# Patient Record
Sex: FEMALE | Race: WHITE | Employment: OTHER | URBAN - METROPOLITAN AREA
[De-identification: names, ages, dates, MRNs, and addresses within clinical notes are randomized per-mention and may not be internally consistent; named-entity substitution may affect disease eponyms.]

---

## 2016-03-09 LAB — DIABETIC RETINOPATHY: NORMAL

## 2019-03-01 LAB
ALBUMIN SERPL-MCNC: NORMAL G/DL
AVERAGE GLUCOSE: NORMAL
BUN / CREAT RATIO: NORMAL
BUN BLDV-MCNC: NORMAL MG/DL
CALCIUM SERPL-MCNC: NORMAL MG/DL
CHLORIDE BLD-SCNC: NORMAL MMOL/L
CHOLESTEROL, TOTAL: NORMAL MG/DL
CHOLESTEROL/HDL RATIO: NORMAL
CO2: NORMAL MMOL/L
CREAT SERPL-MCNC: NORMAL MG/DL
GLUCOSE: NORMAL
HBA1C MFR BLD: 6.6 %
HDLC SERPL-MCNC: NORMAL MG/DL (ref 35–70)
LDL CHOLESTEROL CALCULATED: NORMAL MG/DL (ref 0–160)
PHOSPHORUS: NORMAL MG/DL
POTASSIUM SERPL-SCNC: NORMAL MMOL/L
SODIUM BLD-SCNC: NORMAL MMOL/L
TRIGL SERPL-MCNC: NORMAL MG/DL
VLDLC SERPL CALC-MCNC: NORMAL MG/DL

## 2019-04-08 ENCOUNTER — OFFICE VISIT (OUTPATIENT)
Dept: ORTHOPEDIC SURGERY | Age: 84
End: 2019-04-08
Payer: COMMERCIAL

## 2019-04-08 VITALS
HEART RATE: 63 BPM | RESPIRATION RATE: 18 BRPM | HEIGHT: 65 IN | DIASTOLIC BLOOD PRESSURE: 85 MMHG | WEIGHT: 178 LBS | TEMPERATURE: 98.1 F | SYSTOLIC BLOOD PRESSURE: 158 MMHG | BODY MASS INDEX: 29.66 KG/M2

## 2019-04-08 DIAGNOSIS — M65.341 ACQUIRED TRIGGER FINGER OF RIGHT RING FINGER: Primary | ICD-10-CM

## 2019-04-08 DIAGNOSIS — G56.01 RIGHT CARPAL TUNNEL SYNDROME: ICD-10-CM

## 2019-04-08 DIAGNOSIS — M18.11 ARTHRITIS OF CARPOMETACARPAL (CMC) JOINT OF RIGHT THUMB: ICD-10-CM

## 2019-04-08 PROCEDURE — 99203 OFFICE O/P NEW LOW 30 MIN: CPT | Performed by: ORTHOPAEDIC SURGERY

## 2019-04-08 PROCEDURE — 20550 NJX 1 TENDON SHEATH/LIGAMENT: CPT | Performed by: ORTHOPAEDIC SURGERY

## 2019-04-08 RX ORDER — ALLOPURINOL 100 MG/1
100 TABLET ORAL
COMMUNITY
Start: 2011-11-10 | End: 2019-09-10 | Stop reason: SDUPTHER

## 2019-04-08 RX ORDER — GLIMEPIRIDE 1 MG/1
TABLET ORAL
COMMUNITY
Start: 2019-01-10

## 2019-04-08 RX ORDER — HYDROCODONE BITARTRATE AND ACETAMINOPHEN 10; 325 MG/1; MG/1
TABLET ORAL
COMMUNITY
Start: 2019-03-11 | End: 2019-06-07 | Stop reason: ALTCHOICE

## 2019-04-08 RX ORDER — DULOXETIN HYDROCHLORIDE 60 MG/1
CAPSULE, DELAYED RELEASE ORAL
COMMUNITY
Start: 2019-01-02 | End: 2019-09-26 | Stop reason: SDUPTHER

## 2019-04-08 RX ORDER — METOPROLOL TARTRATE 50 MG/1
25 TABLET, FILM COATED ORAL
COMMUNITY
End: 2020-04-13

## 2019-04-08 RX ORDER — DEXAMETHASONE SODIUM PHOSPHATE 4 MG/ML
4 INJECTION, SOLUTION INTRA-ARTICULAR; INTRALESIONAL; INTRAMUSCULAR; INTRAVENOUS; SOFT TISSUE ONCE
Status: COMPLETED | OUTPATIENT
Start: 2019-04-08 | End: 2019-04-08

## 2019-04-08 RX ORDER — LANOLIN ALCOHOL/MO/W.PET/CERES
CREAM (GRAM) TOPICAL
COMMUNITY

## 2019-04-08 RX ORDER — SIMVASTATIN 80 MG
TABLET ORAL
COMMUNITY
End: 2019-10-21 | Stop reason: SDUPTHER

## 2019-04-08 RX ORDER — DIGOXIN 125 MCG
TABLET ORAL
COMMUNITY
End: 2019-06-07 | Stop reason: SDUPTHER

## 2019-04-08 RX ORDER — WARFARIN SODIUM 4 MG/1
TABLET ORAL
COMMUNITY
Start: 2019-01-10 | End: 2019-07-22 | Stop reason: SDUPTHER

## 2019-04-08 RX ORDER — ASPIRIN 81 MG/1
TABLET ORAL
COMMUNITY
Start: 2009-02-12 | End: 2020-08-24

## 2019-04-08 RX ADMIN — DEXAMETHASONE SODIUM PHOSPHATE 4 MG: 4 INJECTION, SOLUTION INTRA-ARTICULAR; INTRALESIONAL; INTRAMUSCULAR; INTRAVENOUS; SOFT TISSUE at 15:12

## 2019-06-07 ENCOUNTER — OFFICE VISIT (OUTPATIENT)
Dept: FAMILY MEDICINE CLINIC | Age: 84
End: 2019-06-07
Payer: COMMERCIAL

## 2019-06-07 VITALS
HEART RATE: 77 BPM | SYSTOLIC BLOOD PRESSURE: 124 MMHG | HEIGHT: 65 IN | WEIGHT: 176.6 LBS | BODY MASS INDEX: 29.42 KG/M2 | TEMPERATURE: 96.9 F | OXYGEN SATURATION: 97 % | DIASTOLIC BLOOD PRESSURE: 74 MMHG

## 2019-06-07 DIAGNOSIS — Z79.01 ANTICOAGULANT LONG-TERM USE: Primary | ICD-10-CM

## 2019-06-07 DIAGNOSIS — M54.50 LUMBAR PAIN: ICD-10-CM

## 2019-06-07 DIAGNOSIS — E11.00 TYPE 2 DIABETES MELLITUS WITH HYPEROSMOLARITY WITHOUT COMA, WITHOUT LONG-TERM CURRENT USE OF INSULIN (HCC): ICD-10-CM

## 2019-06-07 DIAGNOSIS — I48.20 CHRONIC ATRIAL FIBRILLATION (HCC): ICD-10-CM

## 2019-06-07 LAB
INTERNATIONAL NORMALIZATION RATIO, POC: 1.8
PROTHROMBIN TIME, POC: 21.1

## 2019-06-07 PROCEDURE — 85610 PROTHROMBIN TIME: CPT | Performed by: INTERNAL MEDICINE

## 2019-06-07 PROCEDURE — 99213 OFFICE O/P EST LOW 20 MIN: CPT | Performed by: INTERNAL MEDICINE

## 2019-06-07 RX ORDER — HYDROCODONE BITARTRATE AND ACETAMINOPHEN 7.5; 325 MG/1; MG/1
1 TABLET ORAL EVERY 6 HOURS PRN
Qty: 90 TABLET | Refills: 0 | Status: SHIPPED | OUTPATIENT
Start: 2019-06-07 | End: 2019-07-07

## 2019-06-07 RX ORDER — DIGOXIN 125 MCG
125 TABLET ORAL DAILY
Qty: 90 TABLET | Refills: 1 | Status: SHIPPED | OUTPATIENT
Start: 2019-06-07 | End: 2019-12-20 | Stop reason: SDUPTHER

## 2019-06-07 ASSESSMENT — PATIENT HEALTH QUESTIONNAIRE - PHQ9
2. FEELING DOWN, DEPRESSED OR HOPELESS: 0
SUM OF ALL RESPONSES TO PHQ QUESTIONS 1-9: 0
1. LITTLE INTEREST OR PLEASURE IN DOING THINGS: 0
SUM OF ALL RESPONSES TO PHQ9 QUESTIONS 1 & 2: 0
SUM OF ALL RESPONSES TO PHQ QUESTIONS 1-9: 0

## 2019-06-07 NOTE — PROGRESS NOTES
2019     Joshua Hi (:  1935) is a 80 y.o. female presents for recheck of INR and medication refills. She had fallen at [facility] last week had a large bruise underneath her right eye and on the right ankle. OARRS 6-7-19  She will be due for a urine drug screen next month. Pain at its worse is an 8/10 and at its best a 2/10  most of the pain is in her lower back and knees. She does seem a little confused today when I have explained the instructions for her Coumadin. I will be lowering the amount of narcotic medication on the patient as I cannot assure that the patient is receiving this. Review of Systems    Prior to Visit Medications    Medication Sig Taking? Authorizing Provider   digoxin (LANOXIN) 125 MCG tablet Take 1 tablet by mouth daily Yes Rose Tompkins DO   HYDROcodone-acetaminophen (NORCO) 7.5-325 MG per tablet Take 1 tablet by mouth every 6 hours as needed for Pain for up to 30 days.  Intended supply: 30 days Yes Rose Tompkins DO   allopurinol (ZYLOPRIM) 100 MG tablet Take 100 mg by mouth Yes Historical Provider, MD   aspirin (ECOTRIN LOW STRENGTH) 81 MG EC tablet Take by mouth Yes Historical Provider, MD   DULoxetine (CYMBALTA) 60 MG extended release capsule  Yes Historical Provider, MD   LUTEIN PO Take by mouth Yes Historical Provider, MD   metFORMIN (GLUCOPHAGE) 500 MG tablet Take by mouth Yes Historical Provider, MD   metoprolol tartrate (LOPRESSOR) 50 MG tablet Take by mouth Yes Historical Provider, MD   niacin (SLO-NIACIN) 500 MG extended release tablet Take by mouth Yes Historical Provider, MD   simvastatin (ZOCOR) 80 MG tablet Take by mouth Yes Historical Provider, MD   warfarin (COUMADIN) 4 MG tablet  Yes Historical Provider, MD   glimepiride (AMARYL) 1 MG tablet   Historical Provider, MD        Social History     Tobacco Use    Smoking status: Never Smoker    Smokeless tobacco: Never Used   Substance Use Topics    Alcohol use: Not on file Vitals:    06/07/19 1401   BP: 124/74   Pulse: 77   Temp: 96.9 °F (36.1 °C)   SpO2: 97%   Weight: 176 lb 9.6 oz (80.1 kg)   Height: 5' 5\" (1.651 m)     Estimated body mass index is 29.39 kg/m² as calculated from the following:    Height as of this encounter: 5' 5\" (1.651 m). Weight as of this encounter: 176 lb 9.6 oz (80.1 kg). Physical Exam   Heart is atrial fibrillation and flutter there is no gallops noted. Lungs are clear to auscultation without wheezes rales or rhonchi. HEENT exam shows posterior pharynx intact. Tongue and uvula midline and intact. I do not notice any contusions of the face. However the right ankle does still show contusion but there is no lacerations or scabbed lesions. No edema of the lower extremities today. She ambulates without difficulty and can go from a seated to a standing position on her own without assistance required. ASSESSMENT/PLAN:    ICD-10-CM    1. Anticoagulant long-term use Z79.01 POCT INR     POCT INR     APTT   2. Chronic atrial fibrillation (HCC) I48.2 APTT     Renal Panel     CBC Auto Differential   3. Lumbar pain M54.5 HYDROcodone-acetaminophen (NORCO) 7.5-325 MG per tablet   4. Type 2 diabetes mellitus with hyperosmolarity without coma, without long-term current use of insulin (HCC) E11.00 Hemoglobin A1C      Patient requests narcotic medication refill. I have reviewed the current medications and prior notes regarding the need for these refills  I believe the need for refill is warranted at this time. I have reviewed the OARRS report and have found no suspicion of drug seeking behavior. I have discussed the side effects and narcotic policy with this patient ad the patient has demonstrated understanding. Following the guidelines of the CDC, [facility] and [facility] for a higher requirements refill of the narcotic was given today  A follow up appointment will be scheduled with me.       Patient's INR was only 1.8 today but she admitted she had missed at least

## 2019-06-07 NOTE — PATIENT INSTRUCTIONS
1.  Have Laboratory performed in 2 weeks for recheck of the INR also  2. Look in patient's provider network book for dermatology and I will make a referral  3. Coumadin is to be 4 mg daily.  1 time only take 8 mg Coumadin on Saturday 6-8-2019

## 2019-06-26 LAB
BASOPHILS ABSOLUTE: NORMAL /ΜL
BASOPHILS RELATIVE PERCENT: NORMAL %
EOSINOPHILS ABSOLUTE: NORMAL /ΜL
EOSINOPHILS RELATIVE PERCENT: NORMAL %
HCT VFR BLD CALC: NORMAL % (ref 36–46)
HEMOGLOBIN: NORMAL G/DL (ref 12–16)
LYMPHOCYTES ABSOLUTE: NORMAL /ΜL
LYMPHOCYTES RELATIVE PERCENT: NORMAL %
MCH RBC QN AUTO: NORMAL PG
MCHC RBC AUTO-ENTMCNC: NORMAL G/DL
MCV RBC AUTO: NORMAL FL
MONOCYTES ABSOLUTE: NORMAL /ΜL
MONOCYTES RELATIVE PERCENT: NORMAL %
NEUTROPHILS ABSOLUTE: NORMAL /ΜL
NEUTROPHILS RELATIVE PERCENT: NORMAL %
PDW BLD-RTO: NORMAL %
PLATELET # BLD: NORMAL K/ΜL
PMV BLD AUTO: NORMAL FL
RBC # BLD: NORMAL 10^6/ΜL
WBC # BLD: NORMAL 10^3/ML

## 2019-07-04 LAB
BASOPHILS ABSOLUTE: NORMAL /ΜL
BASOPHILS RELATIVE PERCENT: NORMAL %
BILIRUBIN, URINE: NORMAL
BLOOD, URINE: NORMAL
BUN BLDV-MCNC: 23 MG/DL
CALCIUM SERPL-MCNC: 9.3 MG/DL
CHLORIDE BLD-SCNC: 104 MMOL/L
CLARITY: NORMAL
CO2: 21 MMOL/L
COLOR: NORMAL
CREAT SERPL-MCNC: 0.9 MG/DL
EOSINOPHILS ABSOLUTE: NORMAL /ΜL
EOSINOPHILS RELATIVE PERCENT: NORMAL %
GFR CALCULATED: 60
GLUCOSE BLD-MCNC: 229 MG/DL
GLUCOSE URINE: NORMAL
HCT VFR BLD CALC: NORMAL % (ref 36–46)
HEMOGLOBIN: NORMAL G/DL (ref 12–16)
KETONES, URINE: NORMAL
LEUKOCYTE ESTERASE, URINE: NORMAL
LYMPHOCYTES ABSOLUTE: NORMAL /ΜL
LYMPHOCYTES RELATIVE PERCENT: NORMAL %
MCH RBC QN AUTO: NORMAL PG
MCHC RBC AUTO-ENTMCNC: NORMAL G/DL
MCV RBC AUTO: NORMAL FL
MONOCYTES ABSOLUTE: NORMAL /ΜL
MONOCYTES RELATIVE PERCENT: NORMAL %
NEUTROPHILS ABSOLUTE: NORMAL /ΜL
NEUTROPHILS RELATIVE PERCENT: NORMAL %
NITRITE, URINE: NORMAL
PH UA: NORMAL (ref 4.5–8)
PLATELET # BLD: NORMAL K/ΜL
PMV BLD AUTO: NORMAL FL
POTASSIUM SERPL-SCNC: 4.5 MMOL/L
PROTEIN UA: NORMAL
RBC # BLD: NORMAL 10^6/ΜL
SODIUM BLD-SCNC: 138 MMOL/L
SPECIFIC GRAVITY, URINE: NORMAL
UROBILINOGEN, URINE: NORMAL
WBC # BLD: NORMAL 10^3/ML

## 2019-07-09 ENCOUNTER — TELEPHONE (OUTPATIENT)
Dept: ADMINISTRATIVE | Age: 84
End: 2019-07-09

## 2019-07-10 ENCOUNTER — TELEPHONE (OUTPATIENT)
Dept: FAMILY MEDICINE CLINIC | Age: 84
End: 2019-07-10

## 2019-07-11 ENCOUNTER — TELEPHONE (OUTPATIENT)
Dept: FAMILY MEDICINE CLINIC | Age: 84
End: 2019-07-11

## 2019-07-18 NOTE — TELEPHONE ENCOUNTER
Shalom Nichols from [facility] called and would like to set up HFU appt. Isabella bo 07/10-Hip. No availability. . Please advise.  Shalom Nichols [phone]

## 2019-07-22 ENCOUNTER — OFFICE VISIT (OUTPATIENT)
Dept: FAMILY MEDICINE CLINIC | Age: 84
End: 2019-07-22
Payer: COMMERCIAL

## 2019-07-22 VITALS — TEMPERATURE: 98 F | DIASTOLIC BLOOD PRESSURE: 82 MMHG | OXYGEN SATURATION: 97 % | SYSTOLIC BLOOD PRESSURE: 140 MMHG

## 2019-07-22 DIAGNOSIS — E11.9 TYPE 2 DIABETES MELLITUS WITHOUT COMPLICATION, WITHOUT LONG-TERM CURRENT USE OF INSULIN (HCC): Primary | ICD-10-CM

## 2019-07-22 DIAGNOSIS — Z79.01 ANTICOAGULANT LONG-TERM USE: ICD-10-CM

## 2019-07-22 DIAGNOSIS — S32.601A CLOSED NONDISPLACED FRACTURE OF RIGHT ISCHIUM, UNSPECIFIED FRACTURE MORPHOLOGY, INITIAL ENCOUNTER (HCC): ICD-10-CM

## 2019-07-22 DIAGNOSIS — I10 ESSENTIAL HYPERTENSION, BENIGN: ICD-10-CM

## 2019-07-22 LAB
INTERNATIONAL NORMALIZATION RATIO, POC: 2.8
PROTHROMBIN TIME, POC: 33

## 2019-07-22 PROCEDURE — 99214 OFFICE O/P EST MOD 30 MIN: CPT | Performed by: INTERNAL MEDICINE

## 2019-07-22 PROCEDURE — 85610 PROTHROMBIN TIME: CPT | Performed by: INTERNAL MEDICINE

## 2019-07-22 RX ORDER — CLONIDINE HYDROCHLORIDE 0.1 MG/1
TABLET ORAL
Refills: 0 | COMMUNITY
Start: 2019-07-10

## 2019-07-22 NOTE — PATIENT INSTRUCTIONS
1.  INR is theraputic at 2.7. No changes and recheck warfarin in 1 month  2. Metformin 500mg is to be taken at night  3. Stay on Clonidine  4.   Metoprolol is to stay at 25mg twice a day

## 2019-07-23 RX ORDER — WARFARIN SODIUM 4 MG/1
TABLET ORAL
Qty: 180 TABLET | Refills: 1 | Status: SHIPPED
Start: 2019-07-23 | End: 2020-07-28

## 2019-07-29 ENCOUNTER — TELEPHONE (OUTPATIENT)
Dept: FAMILY MEDICINE CLINIC | Age: 84
End: 2019-07-29

## 2019-07-29 NOTE — TELEPHONE ENCOUNTER
Bambi Varghese went out to home & here is their update-- pt has taken all meds as directed. Has held Dig over the last 4 days. BP is 180/90, HR 55. Denies dizziness, N/V, headaches, no swelling noted, lungs are clear.  If there are any changes in orders, we are to contact [facility]

## 2019-07-29 NOTE — TELEPHONE ENCOUNTER
Pt called & left vm stating her pulse is 48 & she would like some guidance on what to do next. Left message for pt to return call.

## 2019-07-29 NOTE — TELEPHONE ENCOUNTER
Pt called [facility] & made them aware her bp is 182/88 & pulse is 48 . They said if you'd like them to go out today to check her, they need an order.

## 2019-08-12 RX ORDER — CLONIDINE HYDROCHLORIDE 0.1 MG/1
0.1 TABLET ORAL 2 TIMES DAILY
Qty: 60 TABLET | Refills: 0 | Status: SHIPPED | OUTPATIENT
Start: 2019-08-12

## 2019-08-12 RX ORDER — CLONIDINE HYDROCHLORIDE 0.1 MG/1
0.1 TABLET ORAL 2 TIMES DAILY
COMMUNITY
End: 2019-08-12 | Stop reason: SDUPTHER

## 2019-08-13 DIAGNOSIS — I48.20 CHRONIC ATRIAL FIBRILLATION (HCC): ICD-10-CM

## 2019-08-13 DIAGNOSIS — Z79.01 ANTICOAGULANT LONG-TERM USE: ICD-10-CM

## 2019-08-13 DIAGNOSIS — E11.00 TYPE 2 DIABETES MELLITUS WITH HYPEROSMOLARITY WITHOUT COMA, WITHOUT LONG-TERM CURRENT USE OF INSULIN (HCC): ICD-10-CM

## 2019-08-22 ENCOUNTER — ANTI-COAG VISIT (OUTPATIENT)
Dept: FAMILY MEDICINE CLINIC | Age: 84
End: 2019-08-22
Payer: COMMERCIAL

## 2019-08-22 DIAGNOSIS — Z79.01 ANTICOAGULANT LONG-TERM USE: Primary | ICD-10-CM

## 2019-08-22 DIAGNOSIS — I48.20 CHRONIC ATRIAL FIBRILLATION (HCC): ICD-10-CM

## 2019-08-22 DIAGNOSIS — E11.9 TYPE 2 DIABETES MELLITUS WITHOUT COMPLICATION, WITHOUT LONG-TERM CURRENT USE OF INSULIN (HCC): ICD-10-CM

## 2019-08-22 DIAGNOSIS — I10 ESSENTIAL HYPERTENSION, BENIGN: ICD-10-CM

## 2019-08-22 PROCEDURE — 99213 OFFICE O/P EST LOW 20 MIN: CPT | Performed by: INTERNAL MEDICINE

## 2019-08-26 PROBLEM — I10 ESSENTIAL HYPERTENSION, BENIGN: Status: ACTIVE | Noted: 2019-08-26

## 2019-08-26 PROBLEM — Z79.01 ANTICOAGULANT LONG-TERM USE: Status: ACTIVE | Noted: 2019-08-26

## 2019-08-26 PROBLEM — E11.9 TYPE 2 DIABETES MELLITUS WITHOUT COMPLICATION, WITHOUT LONG-TERM CURRENT USE OF INSULIN (HCC): Status: ACTIVE | Noted: 2019-08-26

## 2019-08-26 PROBLEM — I48.20 CHRONIC ATRIAL FIBRILLATION (HCC): Status: ACTIVE | Noted: 2019-08-26

## 2019-08-26 PROBLEM — M54.50 LUMBAR PAIN: Status: ACTIVE | Noted: 2019-08-26

## 2019-08-29 ENCOUNTER — NURSE ONLY (OUTPATIENT)
Dept: FAMILY MEDICINE CLINIC | Age: 84
End: 2019-08-29
Payer: COMMERCIAL

## 2019-08-29 DIAGNOSIS — Z79.01 ANTICOAGULANT LONG-TERM USE: ICD-10-CM

## 2019-08-29 LAB
INTERNATIONAL NORMALIZATION RATIO, POC: 2.6
PROTHROMBIN TIME, POC: 31.4

## 2019-08-29 PROCEDURE — 85610 PROTHROMBIN TIME: CPT | Performed by: INTERNAL MEDICINE

## 2019-09-13 RX ORDER — ALLOPURINOL 100 MG/1
TABLET ORAL
Qty: 90 TABLET | Refills: 1 | Status: SHIPPED
Start: 2019-09-13 | End: 2020-03-05 | Stop reason: SDUPTHER

## 2019-09-26 ENCOUNTER — ANTI-COAG VISIT (OUTPATIENT)
Dept: FAMILY MEDICINE CLINIC | Age: 84
End: 2019-09-26
Payer: COMMERCIAL

## 2019-09-26 VITALS
SYSTOLIC BLOOD PRESSURE: 124 MMHG | DIASTOLIC BLOOD PRESSURE: 70 MMHG | TEMPERATURE: 97.1 F | HEART RATE: 68 BPM | OXYGEN SATURATION: 98 %

## 2019-09-26 DIAGNOSIS — I48.20 CHRONIC ATRIAL FIBRILLATION (HCC): Primary | ICD-10-CM

## 2019-09-26 DIAGNOSIS — I10 ESSENTIAL HYPERTENSION, BENIGN: ICD-10-CM

## 2019-09-26 DIAGNOSIS — E11.9 TYPE 2 DIABETES MELLITUS WITHOUT COMPLICATION, WITHOUT LONG-TERM CURRENT USE OF INSULIN (HCC): ICD-10-CM

## 2019-09-26 LAB — INTERNATIONAL NORMALIZATION RATIO, POC: 2.2

## 2019-09-26 PROCEDURE — 85610 PROTHROMBIN TIME: CPT | Performed by: INTERNAL MEDICINE

## 2019-09-26 PROCEDURE — 99213 OFFICE O/P EST LOW 20 MIN: CPT | Performed by: INTERNAL MEDICINE

## 2019-09-26 RX ORDER — DULOXETIN HYDROCHLORIDE 60 MG/1
60 CAPSULE, DELAYED RELEASE ORAL DAILY
Qty: 30 CAPSULE | Refills: 5 | Status: SHIPPED
Start: 2019-09-26 | End: 2020-04-13

## 2019-09-26 RX ORDER — SOLIFENACIN SUCCINATE 5 MG/1
5 TABLET, FILM COATED ORAL DAILY
Qty: 30 TABLET | Refills: 5 | Status: SHIPPED | OUTPATIENT
Start: 2019-09-26 | End: 2020-01-06 | Stop reason: SDUPTHER

## 2019-09-26 RX ORDER — RANITIDINE 150 MG/1
150 TABLET ORAL 2 TIMES DAILY PRN
Qty: 60 TABLET | Refills: 0 | Status: SHIPPED
Start: 2019-09-26 | End: 2020-08-24

## 2019-10-02 ENCOUNTER — TELEPHONE (OUTPATIENT)
Dept: FAMILY MEDICINE CLINIC | Age: 84
End: 2019-10-02

## 2019-10-18 SDOH — HEALTH STABILITY: MENTAL HEALTH: HOW OFTEN DO YOU HAVE A DRINK CONTAINING ALCOHOL?: NEVER

## 2019-10-21 ENCOUNTER — OFFICE VISIT (OUTPATIENT)
Dept: FAMILY MEDICINE CLINIC | Age: 84
End: 2019-10-21
Payer: COMMERCIAL

## 2019-10-21 VITALS
OXYGEN SATURATION: 96 % | BODY MASS INDEX: 29.02 KG/M2 | HEIGHT: 64 IN | SYSTOLIC BLOOD PRESSURE: 152 MMHG | TEMPERATURE: 97.3 F | WEIGHT: 170 LBS | HEART RATE: 59 BPM | DIASTOLIC BLOOD PRESSURE: 68 MMHG

## 2019-10-21 DIAGNOSIS — Z13.820 SCREENING FOR OSTEOPOROSIS: ICD-10-CM

## 2019-10-21 DIAGNOSIS — Z79.01 ANTICOAGULANT LONG-TERM USE: ICD-10-CM

## 2019-10-21 DIAGNOSIS — S60.221A TRAUMATIC HEMATOMA OF RIGHT HAND, INITIAL ENCOUNTER: ICD-10-CM

## 2019-10-21 DIAGNOSIS — T14.90XA TRAUMA: Primary | ICD-10-CM

## 2019-10-21 LAB — INTERNATIONAL NORMALIZATION RATIO, POC: 1.5

## 2019-10-21 PROCEDURE — 99214 OFFICE O/P EST MOD 30 MIN: CPT | Performed by: INTERNAL MEDICINE

## 2019-10-21 PROCEDURE — 85610 PROTHROMBIN TIME: CPT | Performed by: INTERNAL MEDICINE

## 2019-10-21 RX ORDER — TRAMADOL HYDROCHLORIDE 50 MG/1
50 TABLET ORAL EVERY 8 HOURS PRN
Qty: 30 TABLET | Refills: 0 | Status: SHIPPED | OUTPATIENT
Start: 2019-10-21 | End: 2020-02-10 | Stop reason: SDUPTHER

## 2019-10-21 RX ORDER — SIMVASTATIN 80 MG
80 TABLET ORAL NIGHTLY
Qty: 90 TABLET | Refills: 1 | Status: SHIPPED
Start: 2019-10-21 | End: 2020-04-13

## 2019-11-05 ENCOUNTER — OFFICE VISIT (OUTPATIENT)
Dept: FAMILY MEDICINE CLINIC | Age: 84
End: 2019-11-05
Payer: COMMERCIAL

## 2019-11-05 VITALS
BODY MASS INDEX: 28.68 KG/M2 | TEMPERATURE: 97.9 F | OXYGEN SATURATION: 98 % | WEIGHT: 168 LBS | DIASTOLIC BLOOD PRESSURE: 84 MMHG | HEIGHT: 64 IN | SYSTOLIC BLOOD PRESSURE: 142 MMHG | HEART RATE: 50 BPM

## 2019-11-05 DIAGNOSIS — I10 ESSENTIAL HYPERTENSION, BENIGN: ICD-10-CM

## 2019-11-05 DIAGNOSIS — E11.9 TYPE 2 DIABETES MELLITUS WITHOUT COMPLICATION, WITHOUT LONG-TERM CURRENT USE OF INSULIN (HCC): ICD-10-CM

## 2019-11-05 DIAGNOSIS — I48.20 CHRONIC ATRIAL FIBRILLATION (HCC): Primary | ICD-10-CM

## 2019-11-05 LAB — INTERNATIONAL NORMALIZATION RATIO, POC: 2.7

## 2019-11-05 PROCEDURE — 99213 OFFICE O/P EST LOW 20 MIN: CPT | Performed by: INTERNAL MEDICINE

## 2019-11-05 PROCEDURE — 85610 PROTHROMBIN TIME: CPT | Performed by: INTERNAL MEDICINE

## 2019-11-18 ENCOUNTER — TELEPHONE (OUTPATIENT)
Dept: FAMILY MEDICINE CLINIC | Age: 84
End: 2019-11-18

## 2019-12-06 ENCOUNTER — HOSPITAL ENCOUNTER (OUTPATIENT)
Age: 84
Discharge: HOME | End: 2019-12-08
Payer: COMMERCIAL

## 2019-12-06 ENCOUNTER — OFFICE VISIT (OUTPATIENT)
Dept: FAMILY MEDICINE CLINIC | Age: 84
End: 2019-12-06
Payer: COMMERCIAL

## 2019-12-06 VITALS
BODY MASS INDEX: 29.02 KG/M2 | HEIGHT: 64 IN | TEMPERATURE: 97.6 F | SYSTOLIC BLOOD PRESSURE: 136 MMHG | OXYGEN SATURATION: 99 % | HEART RATE: 57 BPM | DIASTOLIC BLOOD PRESSURE: 72 MMHG | WEIGHT: 170 LBS

## 2019-12-06 DIAGNOSIS — I48.20 CHRONIC ATRIAL FIBRILLATION (HCC): ICD-10-CM

## 2019-12-06 DIAGNOSIS — I48.20 CHRONIC ATRIAL FIBRILLATION (HCC): Primary | ICD-10-CM

## 2019-12-06 DIAGNOSIS — E11.9 TYPE 2 DIABETES MELLITUS WITHOUT COMPLICATION, WITHOUT LONG-TERM CURRENT USE OF INSULIN (HCC): ICD-10-CM

## 2019-12-06 DIAGNOSIS — I10 ESSENTIAL HYPERTENSION, BENIGN: ICD-10-CM

## 2019-12-06 LAB
ALBUMIN SERPL-MCNC: 4 G/DL (ref 3.5–5.2)
ALP BLD-CCNC: 103 U/L (ref 35–104)
ALT SERPL-CCNC: 15 U/L (ref 0–32)
ANION GAP SERPL CALCULATED.3IONS-SCNC: 18 MMOL/L (ref 7–16)
AST SERPL-CCNC: 20 U/L (ref 0–31)
BASOPHILS ABSOLUTE: 0.07 E9/L (ref 0–0.2)
BASOPHILS RELATIVE PERCENT: 0.7 % (ref 0–2)
BILIRUB SERPL-MCNC: 0.5 MG/DL (ref 0–1.2)
BILIRUBIN DIRECT: <0.2 MG/DL (ref 0–0.3)
BILIRUBIN, INDIRECT: NORMAL MG/DL (ref 0–1)
BUN BLDV-MCNC: 19 MG/DL (ref 8–23)
CALCIUM SERPL-MCNC: 9.7 MG/DL (ref 8.6–10.2)
CHLORIDE BLD-SCNC: 104 MMOL/L (ref 98–107)
CHOLESTEROL, TOTAL: 168 MG/DL (ref 0–199)
CO2: 21 MMOL/L (ref 22–29)
CREAT SERPL-MCNC: 0.9 MG/DL (ref 0.5–1)
EOSINOPHILS ABSOLUTE: 0.16 E9/L (ref 0.05–0.5)
EOSINOPHILS RELATIVE PERCENT: 1.7 % (ref 0–6)
GFR AFRICAN AMERICAN: >60
GFR NON-AFRICAN AMERICAN: 60 ML/MIN/1.73
GLUCOSE BLD-MCNC: 253 MG/DL (ref 74–99)
HBA1C MFR BLD: 7.7 % (ref 4–5.6)
HCT VFR BLD CALC: 48.6 % (ref 34–48)
HDLC SERPL-MCNC: 38 MG/DL
HEMOGLOBIN: 15 G/DL (ref 11.5–15.5)
IMMATURE GRANULOCYTES #: 0.03 E9/L
IMMATURE GRANULOCYTES %: 0.3 % (ref 0–5)
INTERNATIONAL NORMALIZATION RATIO, POC: 4.2
LDL CHOLESTEROL CALCULATED: 79 MG/DL (ref 0–99)
LYMPHOCYTES ABSOLUTE: 1.98 E9/L (ref 1.5–4)
LYMPHOCYTES RELATIVE PERCENT: 21 % (ref 20–42)
MCH RBC QN AUTO: 28.4 PG (ref 26–35)
MCHC RBC AUTO-ENTMCNC: 30.9 % (ref 32–34.5)
MCV RBC AUTO: 92 FL (ref 80–99.9)
MONOCYTES ABSOLUTE: 0.98 E9/L (ref 0.1–0.95)
MONOCYTES RELATIVE PERCENT: 10.4 % (ref 2–12)
NEUTROPHILS ABSOLUTE: 6.22 E9/L (ref 1.8–7.3)
NEUTROPHILS RELATIVE PERCENT: 65.9 % (ref 43–80)
PDW BLD-RTO: 15 FL (ref 11.5–15)
PHOSPHORUS: 3.4 MG/DL (ref 2.5–4.5)
PLATELET # BLD: 288 E9/L (ref 130–450)
PMV BLD AUTO: 10.8 FL (ref 7–12)
POTASSIUM SERPL-SCNC: 4.5 MMOL/L (ref 3.5–5)
RBC # BLD: 5.28 E12/L (ref 3.5–5.5)
SODIUM BLD-SCNC: 143 MMOL/L (ref 132–146)
TOTAL PROTEIN: 7.1 G/DL (ref 6.4–8.3)
TRIGL SERPL-MCNC: 253 MG/DL (ref 0–149)
VLDLC SERPL CALC-MCNC: 51 MG/DL
WBC # BLD: 9.4 E9/L (ref 4.5–11.5)

## 2019-12-06 PROCEDURE — 80061 LIPID PANEL: CPT

## 2019-12-06 PROCEDURE — 85025 COMPLETE CBC W/AUTO DIFF WBC: CPT

## 2019-12-06 PROCEDURE — 82247 BILIRUBIN TOTAL: CPT

## 2019-12-06 PROCEDURE — 80069 RENAL FUNCTION PANEL: CPT

## 2019-12-06 PROCEDURE — 85610 PROTHROMBIN TIME: CPT | Performed by: INTERNAL MEDICINE

## 2019-12-06 PROCEDURE — 84155 ASSAY OF PROTEIN SERUM: CPT

## 2019-12-06 PROCEDURE — 36415 COLL VENOUS BLD VENIPUNCTURE: CPT

## 2019-12-06 PROCEDURE — 99213 OFFICE O/P EST LOW 20 MIN: CPT | Performed by: INTERNAL MEDICINE

## 2019-12-06 PROCEDURE — 84460 ALANINE AMINO (ALT) (SGPT): CPT

## 2019-12-06 PROCEDURE — 82248 BILIRUBIN DIRECT: CPT

## 2019-12-06 PROCEDURE — 84450 TRANSFERASE (AST) (SGOT): CPT

## 2019-12-06 PROCEDURE — 84075 ASSAY ALKALINE PHOSPHATASE: CPT

## 2019-12-06 PROCEDURE — 83036 HEMOGLOBIN GLYCOSYLATED A1C: CPT

## 2019-12-20 ENCOUNTER — OFFICE VISIT (OUTPATIENT)
Dept: FAMILY MEDICINE CLINIC | Age: 84
End: 2019-12-20
Payer: COMMERCIAL

## 2019-12-20 VITALS
OXYGEN SATURATION: 98 % | TEMPERATURE: 96.9 F | HEART RATE: 61 BPM | DIASTOLIC BLOOD PRESSURE: 72 MMHG | SYSTOLIC BLOOD PRESSURE: 134 MMHG

## 2019-12-20 DIAGNOSIS — I48.20 CHRONIC ATRIAL FIBRILLATION (HCC): Primary | ICD-10-CM

## 2019-12-20 DIAGNOSIS — Z79.01 ANTICOAGULANT LONG-TERM USE: ICD-10-CM

## 2019-12-20 DIAGNOSIS — I10 ESSENTIAL HYPERTENSION, BENIGN: ICD-10-CM

## 2019-12-20 LAB — INTERNATIONAL NORMALIZATION RATIO, POC: 1.6

## 2019-12-20 PROCEDURE — 99213 OFFICE O/P EST LOW 20 MIN: CPT | Performed by: INTERNAL MEDICINE

## 2019-12-20 PROCEDURE — 93793 ANTICOAG MGMT PT WARFARIN: CPT | Performed by: INTERNAL MEDICINE

## 2019-12-20 PROCEDURE — 85610 PROTHROMBIN TIME: CPT | Performed by: INTERNAL MEDICINE

## 2019-12-20 RX ORDER — DIGOXIN 125 MCG
125 TABLET ORAL DAILY
Qty: 90 TABLET | Refills: 1 | Status: SHIPPED
Start: 2019-12-20 | End: 2020-05-18 | Stop reason: SDUPTHER

## 2020-01-06 ENCOUNTER — OFFICE VISIT (OUTPATIENT)
Dept: FAMILY MEDICINE CLINIC | Age: 85
End: 2020-01-06
Payer: MEDICARE

## 2020-01-06 VITALS
TEMPERATURE: 97 F | SYSTOLIC BLOOD PRESSURE: 132 MMHG | HEART RATE: 67 BPM | DIASTOLIC BLOOD PRESSURE: 70 MMHG | OXYGEN SATURATION: 98 %

## 2020-01-06 LAB — INTERNATIONAL NORMALIZATION RATIO, POC: 1.8

## 2020-01-06 PROCEDURE — 99213 OFFICE O/P EST LOW 20 MIN: CPT | Performed by: INTERNAL MEDICINE

## 2020-01-06 PROCEDURE — 85610 PROTHROMBIN TIME: CPT | Performed by: INTERNAL MEDICINE

## 2020-01-06 RX ORDER — SOLIFENACIN SUCCINATE 5 MG/1
5 TABLET, FILM COATED ORAL DAILY
Qty: 30 TABLET | Refills: 5 | Status: SHIPPED | OUTPATIENT
Start: 2020-01-06 | End: 2020-01-06 | Stop reason: SDUPTHER

## 2020-01-06 RX ORDER — SOLIFENACIN SUCCINATE 5 MG/1
5 TABLET, FILM COATED ORAL DAILY
Qty: 30 TABLET | Refills: 5 | Status: SHIPPED | OUTPATIENT
Start: 2020-01-06 | End: 2020-05-18 | Stop reason: SDUPTHER

## 2020-01-06 ASSESSMENT — PATIENT HEALTH QUESTIONNAIRE - PHQ9
SUM OF ALL RESPONSES TO PHQ QUESTIONS 1-9: 0
SUM OF ALL RESPONSES TO PHQ QUESTIONS 1-9: 0
2. FEELING DOWN, DEPRESSED OR HOPELESS: 0
1. LITTLE INTEREST OR PLEASURE IN DOING THINGS: 0
SUM OF ALL RESPONSES TO PHQ9 QUESTIONS 1 & 2: 0

## 2020-01-06 NOTE — PROGRESS NOTES
Pt is here for a 2 week inr, she states that she stopped taking the bladder medication its over  $100.00 and it is not working that well. 2020     Landon Wray (:  1935) is a 80 y.o. female, [facility] today for an INR recheck and also to discuss her urinary incontinence medication. Although the Vesicare did help her she says it is over $100 with her insurance. Older only she could make the decision as to whether or not this was worthwhile to spend for better bladder control. Otherwise she is having to get up multiple times at night to urinate. With the medication she was only arising twice a night and now it 6 times a night. Denies any chest pain or shortness of breath.:    Chief Complaint   Patient presents with    Office Visit for Anticoagulation Management     2 week          Review of Systems    Prior to Visit Medications    Medication Sig Taking? Authorizing Provider   solifenacin (VESICARE) 5 MG tablet Take 1 tablet by mouth daily Yes Mayra Padgett DO   digoxin (LANOXIN) 125 MCG tablet Take 1 tablet by mouth daily Yes Mayra Padgett DO   simvastatin (ZOCOR) 80 MG tablet Take 1 tablet by mouth nightly Take by mouth Yes Mayra Padgett DO   DULoxetine (CYMBALTA) 60 MG extended release capsule Take 1 capsule by mouth daily Yes Mayra Padgett DO   ranitidine (ZANTAC) 150 MG tablet Take 1 tablet by mouth 2 times daily as needed for Heartburn Yes Mayra Padgett DO   blood glucose test strips (RELION GLUCOSE TEST STRIPS) strip 1 each by In Vitro route daily As needed. Yes Mayra Padgett DO   allopurinol (ZYLOPRIM) 100 MG tablet TAKE 1 TABLET BY MOUTH ONCE DAILY Yes Mayra Padgett DO   cloNIDine (CATAPRES) 0.1 MG tablet Take 1 tablet by mouth 2 times daily Yes Mayra Padgett DO   warfarin (COUMADIN) 4 MG tablet TAKE 1 TABLET BY MOUTH ON TUESDAY, THURSDAY AND SATURDAY. TAKE 2 TABLETS DAILY OTHERWISE.  Yes Mayra Padgett DO

## 2020-01-17 ENCOUNTER — OFFICE VISIT (OUTPATIENT)
Dept: FAMILY MEDICINE CLINIC | Age: 85
End: 2020-01-17
Payer: MEDICARE

## 2020-01-17 ENCOUNTER — TELEPHONE (OUTPATIENT)
Dept: FAMILY MEDICINE CLINIC | Age: 85
End: 2020-01-17

## 2020-01-17 VITALS
BODY MASS INDEX: 29.02 KG/M2 | TEMPERATURE: 96.8 F | HEART RATE: 55 BPM | OXYGEN SATURATION: 98 % | DIASTOLIC BLOOD PRESSURE: 60 MMHG | HEIGHT: 64 IN | RESPIRATION RATE: 16 BRPM | WEIGHT: 170 LBS | SYSTOLIC BLOOD PRESSURE: 100 MMHG

## 2020-01-17 PROCEDURE — 99213 OFFICE O/P EST LOW 20 MIN: CPT | Performed by: PHYSICIAN ASSISTANT

## 2020-01-17 PROCEDURE — 93000 ELECTROCARDIOGRAM COMPLETE: CPT | Performed by: PHYSICIAN ASSISTANT

## 2020-01-17 RX ORDER — MECLIZINE HYDROCHLORIDE 25 MG/1
25 TABLET ORAL 3 TIMES DAILY PRN
Qty: 15 TABLET | Refills: 0 | Status: SHIPPED | OUTPATIENT
Start: 2020-01-17 | End: 2020-01-21 | Stop reason: SDUPTHER

## 2020-01-17 ASSESSMENT — ENCOUNTER SYMPTOMS
RESPIRATORY NEGATIVE: 1
COUGH: 0
CHOKING: 0
GASTROINTESTINAL NEGATIVE: 1
STRIDOR: 0
APNEA: 0
CHEST TIGHTNESS: 0
WHEEZING: 0
SHORTNESS OF BREATH: 0
ALLERGIC/IMMUNOLOGIC NEGATIVE: 1
EYES NEGATIVE: 1

## 2020-01-17 NOTE — PROGRESS NOTES
Chief Complaint   Patient presents with    Dizziness    Otalgia       HPI:  Patient presents today for  dizzyness this am. Feels better now. No CP or SOB. History of chronic atrial fibrillation. She is on Coumadin. Current Outpatient Medications:     solifenacin (VESICARE) 5 MG tablet, Take 1 tablet by mouth daily, Disp: 30 tablet, Rfl: 5    digoxin (LANOXIN) 125 MCG tablet, Take 1 tablet by mouth daily, Disp: 90 tablet, Rfl: 1    simvastatin (ZOCOR) 80 MG tablet, Take 1 tablet by mouth nightly Take by mouth, Disp: 90 tablet, Rfl: 1    DULoxetine (CYMBALTA) 60 MG extended release capsule, Take 1 capsule by mouth daily, Disp: 30 capsule, Rfl: 5    ranitidine (ZANTAC) 150 MG tablet, Take 1 tablet by mouth 2 times daily as needed for Heartburn, Disp: 60 tablet, Rfl: 0    blood glucose test strips (RELION GLUCOSE TEST STRIPS) strip, 1 each by In Vitro route daily As needed. , Disp: 100 each, Rfl: 3    allopurinol (ZYLOPRIM) 100 MG tablet, TAKE 1 TABLET BY MOUTH ONCE DAILY, Disp: 90 tablet, Rfl: 1    cloNIDine (CATAPRES) 0.1 MG tablet, Take 1 tablet by mouth 2 times daily, Disp: 60 tablet, Rfl: 0    warfarin (COUMADIN) 4 MG tablet, TAKE 1 TABLET BY MOUTH ON TUESDAY, THURSDAY AND SATURDAY. TAKE 2 TABLETS DAILY OTHERWISE., Disp: 180 tablet, Rfl: 1    cloNIDine (CATAPRES) 0.1 MG tablet, TAKE 1 TABLET BY MOUTH TWICE DAILY, Disp: , Rfl: 0    metFORMIN (GLUCOPHAGE) 500 MG tablet, Take 1 tablet by mouth nightly, Disp: 60 tablet, Rfl: 5    aspirin (ECOTRIN LOW STRENGTH) 81 MG EC tablet, Take by mouth, Disp: , Rfl:     glimepiride (AMARYL) 1 MG tablet, , Disp: , Rfl:     LUTEIN PO, Take by mouth, Disp: , Rfl:     metoprolol tartrate (LOPRESSOR) 50 MG tablet, Take 25 mg by mouth , Disp: , Rfl:     niacin (SLO-NIACIN) 500 MG extended release tablet, Take by mouth, Disp: , Rfl:     traMADol (ULTRAM) 50 MG tablet, Take 1 tablet by mouth every 8 hours as needed for Pain for up to 30 days. , Disp: 30 tablet, Rfl: 0       Allergies   Allergen Reactions    Latex     Adhesive Tape Rash     Skin blister/\tearing         Review of Systems  Review of Systems   Constitutional: Negative. Negative for appetite change, chills, diaphoresis, fatigue, fever and unexpected weight change. HENT: Negative. Eyes: Negative. Respiratory: Negative. Negative for apnea, cough, choking, chest tightness, shortness of breath, wheezing and stridor. Cardiovascular: Negative. Gastrointestinal: Negative. Endocrine: Negative. Genitourinary: Negative. Allergic/Immunologic: Negative. Neurological: Positive for light-headedness. Negative for dizziness, tremors, seizures, syncope, facial asymmetry, speech difficulty, weakness, numbness and headaches. VS:  /60   Pulse 55   Temp 96.8 °F (36 °C) (Temporal)   Resp 16   Ht 5' 4\" (1.626 m)   Wt 170 lb (77.1 kg)   SpO2 98%   BMI 29.18 kg/m²     Patient's medical, social, and family history reviewed      Physical Exam  Physical Exam  Vitals signs and nursing note reviewed. Constitutional:       General: She is not in acute distress. Appearance: She is normal weight. She is not toxic-appearing. HENT:      Head: Normocephalic. Right Ear: Ear canal and external ear normal. There is no impacted cerumen. Left Ear: Tympanic membrane, ear canal and external ear normal. There is no impacted cerumen. Nose: Nose normal. No congestion or rhinorrhea. Mouth/Throat:      Mouth: Mucous membranes are moist.      Pharynx: Oropharynx is clear. No oropharyngeal exudate. Eyes:      Conjunctiva/sclera: Conjunctivae normal.      Pupils: Pupils are equal, round, and reactive to light. Neck:      Musculoskeletal: Normal range of motion and neck supple. No muscular tenderness. Cardiovascular:      Rate and Rhythm: Bradycardia present. Rhythm irregular. Pulses: Normal pulses. Heart sounds: Normal heart sounds. No murmur. No friction rub.  No gallop. Pulmonary:      Effort: Pulmonary effort is normal.      Breath sounds: Normal breath sounds. Abdominal:      General: Abdomen is flat. Bowel sounds are normal.   Lymphadenopathy:      Cervical: No cervical adenopathy. Skin:     General: Skin is warm and dry. Coloration: Skin is not jaundiced or pale. Findings: No bruising, erythema, lesion or rash. Neurological:      General: No focal deficit present. Mental Status: She is alert and oriented to person, place, and time. Mental status is at baseline. Sensory: No sensory deficit. Motor: No weakness. Coordination: Coordination normal.      Gait: Gait abnormal (wheelchair). Deep Tendon Reflexes: Reflexes normal.   Psychiatric:         Mood and Affect: Mood normal.         Behavior: Behavior normal.         Thought Content: Thought content normal.         Judgment: Judgment normal.           Assessment/Plan:  [facility] was seen today for dizziness and otalgia. Diagnoses and all orders for this visit:    Vertigo  -     EKG 12 Lead; Future  -     EKG 12 Lead      If dizziness becomes more frequent, any chest pain or shortness of breath directly to [facility]. Pt. to follow up if PCP if no better 1 week.      Rolf New PA-C

## 2020-01-17 NOTE — TELEPHONE ENCOUNTER
Patient calling in. She states when she stands up and out of bed she feels like she will pass out. Patient states it happens every time. It started this morning. She states she feels dizzy and lightheaded. Patient stated she did not take her blood pressure. Advised patient to call someone to take her to an express care.

## 2020-01-20 ENCOUNTER — OFFICE VISIT (OUTPATIENT)
Dept: FAMILY MEDICINE CLINIC | Age: 85
End: 2020-01-20
Payer: MEDICARE

## 2020-01-20 VITALS
HEIGHT: 64 IN | WEIGHT: 169 LBS | HEART RATE: 61 BPM | OXYGEN SATURATION: 99 % | BODY MASS INDEX: 28.85 KG/M2 | SYSTOLIC BLOOD PRESSURE: 118 MMHG | DIASTOLIC BLOOD PRESSURE: 82 MMHG | RESPIRATION RATE: 18 BRPM | TEMPERATURE: 97.2 F

## 2020-01-20 PROCEDURE — 99213 OFFICE O/P EST LOW 20 MIN: CPT | Performed by: INTERNAL MEDICINE

## 2020-01-20 NOTE — PROGRESS NOTES
2020     Heavenly Hull (:  1935) is a 80 y.o. female, presents today for an INR follow-up with her son who is off work. Having a lot of sinus congestion symptoms and now has some dizziness. Dizziness occurs whenever she moves her head rapidly. Chief Complaint   Patient presents with    Dizziness     Pt states symptooms have been going on since last friday. Was seen on [facility] care on 19. There has not been any fevers or chills. She is not fallen. Her sons not noted any erratic behavior. Review of Systems    Prior to Visit Medications    Medication Sig Taking? Authorizing Provider   methylPREDNISolone (MEDROL DOSEPACK) 4 MG tablet Take by mouth. Yes Akil Adamson DO   amoxicillin (AMOXIL) 875 MG tablet Take 1 tablet by mouth 2 times daily for 7 days Yes Akil Adamson DO   solifenacin (VESICARE) 5 MG tablet Take 1 tablet by mouth daily Yes Akil Adamson DO   digoxin (LANOXIN) 125 MCG tablet Take 1 tablet by mouth daily Yes Akil Adamson DO   simvastatin (ZOCOR) 80 MG tablet Take 1 tablet by mouth nightly Take by mouth Yes Akil Adamson DO   DULoxetine (CYMBALTA) 60 MG extended release capsule Take 1 capsule by mouth daily Yes Akil Adamson DO   ranitidine (ZANTAC) 150 MG tablet Take 1 tablet by mouth 2 times daily as needed for Heartburn Yes Akil Adamson DO   blood glucose test strips (RELION GLUCOSE TEST STRIPS) strip 1 each by In Vitro route daily As needed. Yes Akil Adamson DO   allopurinol (ZYLOPRIM) 100 MG tablet TAKE 1 TABLET BY MOUTH ONCE DAILY Yes Akil Adamson DO   cloNIDine (CATAPRES) 0.1 MG tablet Take 1 tablet by mouth 2 times daily Yes Akil Adamson DO   warfarin (COUMADIN) 4 MG tablet TAKE 1 TABLET BY MOUTH ON TUESDAY, THURSDAY AND SATURDAY. TAKE 2 TABLETS DAILY OTHERWISE.  Yes Akil Adamson DO   cloNIDine (CATAPRES) 0.1 MG tablet TAKE 1 TABLET BY MOUTH TWICE DAILY Yes

## 2020-01-21 RX ORDER — MECLIZINE HYDROCHLORIDE 25 MG/1
25 TABLET ORAL 3 TIMES DAILY PRN
Qty: 15 TABLET | Refills: 0 | Status: SHIPPED | OUTPATIENT
Start: 2020-01-21 | End: 2020-01-26

## 2020-01-21 RX ORDER — AZITHROMYCIN 250 MG/1
250 TABLET, FILM COATED ORAL SEE ADMIN INSTRUCTIONS
Qty: 6 TABLET | Refills: 0 | OUTPATIENT
Start: 2020-01-21 | End: 2020-01-26

## 2020-01-21 RX ORDER — METHYLPREDNISOLONE 4 MG/1
TABLET ORAL
Qty: 1 KIT | Refills: 0 | Status: SHIPPED | OUTPATIENT
Start: 2020-01-21 | End: 2020-01-27

## 2020-01-21 RX ORDER — AMOXICILLIN 875 MG/1
875 TABLET, COATED ORAL 2 TIMES DAILY
Qty: 14 TABLET | Refills: 0 | Status: SHIPPED | OUTPATIENT
Start: 2020-01-21 | End: 2020-01-28

## 2020-01-21 NOTE — TELEPHONE ENCOUNTER
Last Appointment:  1/20/2020  No future appointments. Patient calling in. She seen you yesterday for a follow up after her express care visit. She states she was told the Meclizine would be called in to [facility] in [place] but the pharmacy did not receive anything. Was Meclizine supposed to be refilled yesterday? I pended the order just in case.   Thank you

## 2020-02-10 ENCOUNTER — OFFICE VISIT (OUTPATIENT)
Dept: FAMILY MEDICINE CLINIC | Age: 85
End: 2020-02-10
Payer: MEDICARE

## 2020-02-10 VITALS
TEMPERATURE: 96.5 F | SYSTOLIC BLOOD PRESSURE: 118 MMHG | WEIGHT: 170 LBS | OXYGEN SATURATION: 98 % | HEART RATE: 66 BPM | BODY MASS INDEX: 29.18 KG/M2 | DIASTOLIC BLOOD PRESSURE: 64 MMHG

## 2020-02-10 LAB — INTERNATIONAL NORMALIZATION RATIO, POC: 2.6

## 2020-02-10 PROCEDURE — 99213 OFFICE O/P EST LOW 20 MIN: CPT | Performed by: INTERNAL MEDICINE

## 2020-02-10 PROCEDURE — 85610 PROTHROMBIN TIME: CPT | Performed by: INTERNAL MEDICINE

## 2020-02-10 PROCEDURE — 93793 ANTICOAG MGMT PT WARFARIN: CPT | Performed by: INTERNAL MEDICINE

## 2020-02-10 RX ORDER — TRAMADOL HYDROCHLORIDE 50 MG/1
50 TABLET ORAL EVERY 8 HOURS PRN
Qty: 30 TABLET | Refills: 2 | Status: SHIPPED | OUTPATIENT
Start: 2020-02-10 | End: 2020-10-02

## 2020-02-10 NOTE — PROGRESS NOTES
2/10/2020     Kelly Ramachandran (:  1935) is a 80 y.o. female, here for evaluation of the following medical concerns:    Chief Complaint   Patient presents with   [facility] Office Visit for Anticoagulation Management     Requesting a refill of her tramadol. She does not have any new complaints for me today. She is requesting completion of the paperwork for her driving privileges from the [facility]. Apparently she has been driving on a suspended license due to medical reasons for almost 2 years. The family did not know about this. Review of Systems    Prior to Visit Medications    Medication Sig Taking? Authorizing Provider   traMADol (ULTRAM) 50 MG tablet Take 1 tablet by mouth every 8 hours as needed for Pain for up to 30 days. Yes Radha Chambers DO   solifenacin (VESICARE) 5 MG tablet Take 1 tablet by mouth daily Yes Radha Chambers DO   digoxin (LANOXIN) 125 MCG tablet Take 1 tablet by mouth daily Yes Radha Chambers DO   simvastatin (ZOCOR) 80 MG tablet Take 1 tablet by mouth nightly Take by mouth Yes Radah Chambers DO   DULoxetine (CYMBALTA) 60 MG extended release capsule Take 1 capsule by mouth daily Yes Radha Chambers DO   ranitidine (ZANTAC) 150 MG tablet Take 1 tablet by mouth 2 times daily as needed for Heartburn Yes Radha Chambers DO   blood glucose test strips (RELION GLUCOSE TEST STRIPS) strip 1 each by In Vitro route daily As needed. Yes Radha Chambers DO   allopurinol (ZYLOPRIM) 100 MG tablet TAKE 1 TABLET BY MOUTH ONCE DAILY Yes Radha Chambers DO   cloNIDine (CATAPRES) 0.1 MG tablet Take 1 tablet by mouth 2 times daily Yes Radha Chambers DO   warfarin (COUMADIN) 4 MG tablet TAKE 1 TABLET BY MOUTH ON TUESDAY, THURSDAY AND SATURDAY. TAKE 2 TABLETS DAILY OTHERWISE.  Yes Radha Chambers DO   cloNIDine (CATAPRES) 0.1 MG tablet TAKE 1 TABLET BY MOUTH TWICE DAILY Yes Historical Provider, MD   metFORMIN (GLUCOPHAGE) 500 MG tablet Take 1 tablet by mouth nightly Yes Oscar Reyes, DO   aspirin (ECOTRIN LOW STRENGTH) 81 MG EC tablet Take by mouth Yes Historical Provider, MD   glimepiride (AMARYL) 1 MG tablet  Yes Historical Provider, MD   LUTEIN PO Take by mouth Yes Historical Provider, MD   metoprolol tartrate (LOPRESSOR) 50 MG tablet Take 25 mg by mouth  Yes Historical Provider, MD   niacin (SLO-NIACIN) 500 MG extended release tablet Take by mouth Yes Historical Provider, MD      Allergies   Allergen Reactions    Latex     Adhesive Tape Rash     Skin blister/\tearing       Past Medical History:   Diagnosis Date    Anxiety     Arrhythmia     PSVT vs A fib    Atrial fibrillation ([facility] [address] 2009    CAD (coronary artery disease)     Diverticulosis     Hyperlipidemia     Hypertension     Osteoarthritis     Post herpetic neuralgia 2004    Scoliosis     Lumbar     Past Surgical History:   Procedure Laterality Date    ANGIOPLASTY  2003    Stent placement     HEMORRHOID SURGERY  2002    HYSTERECTOMY, TOTAL ABDOMINAL      JOINT REPLACEMENT Left 2010    shoulder    LAMINECTOMY  1989    L4-L5    TOTAL HIP ARTHROPLASTY Left       Social History     Tobacco Use    Smoking status: Former Smoker     Types: Cigarettes     Last attempt to quit: 1974     Years since quittin.1    Smokeless tobacco: Never Used   Substance Use Topics    Alcohol use: Never     Frequency: Never        Vitals:    02/10/20 1151   BP: 118/64   Pulse: 66   Temp: 96.5 °F (35.8 °C)   SpO2: 98%   Weight: 170 lb (77.1 kg)     Estimated body mass index is 29.18 kg/m² as calculated from the following:    Height as of 20: 5' 4\" (1.626 m). Weight as of this encounter: 170 lb (77.1 kg). Physical Exam  Constitutional:       Appearance: Normal appearance. HENT:      Head: Normocephalic.       Right Ear: Tympanic membrane, ear canal and external ear normal.      Left Ear: Tympanic membrane, ear canal and external ear normal.      Nose:

## 2020-02-13 ENCOUNTER — TELEPHONE (OUTPATIENT)
Dept: FAMILY MEDICINE CLINIC | Age: 85
End: 2020-02-13

## 2020-02-13 NOTE — TELEPHONE ENCOUNTER
Pt notified a signature is required to continue with drivers license paper work. States she will stop in to sign.

## 2020-03-05 ENCOUNTER — OFFICE VISIT (OUTPATIENT)
Dept: FAMILY MEDICINE CLINIC | Age: 85
End: 2020-03-05
Payer: MEDICARE

## 2020-03-05 ENCOUNTER — ANTI-COAG VISIT (OUTPATIENT)
Dept: FAMILY MEDICINE CLINIC | Age: 85
End: 2020-03-05
Payer: MEDICARE

## 2020-03-05 VITALS
HEIGHT: 64 IN | TEMPERATURE: 97.2 F | WEIGHT: 171 LBS | BODY MASS INDEX: 29.19 KG/M2 | HEART RATE: 56 BPM | OXYGEN SATURATION: 98 % | SYSTOLIC BLOOD PRESSURE: 126 MMHG | RESPIRATION RATE: 18 BRPM | DIASTOLIC BLOOD PRESSURE: 74 MMHG

## 2020-03-05 LAB — INTERNATIONAL NORMALIZATION RATIO, POC: 3.2

## 2020-03-05 PROCEDURE — 85610 PROTHROMBIN TIME: CPT | Performed by: INTERNAL MEDICINE

## 2020-03-05 PROCEDURE — 99213 OFFICE O/P EST LOW 20 MIN: CPT | Performed by: INTERNAL MEDICINE

## 2020-03-05 RX ORDER — ALLOPURINOL 100 MG/1
TABLET ORAL
Qty: 90 TABLET | Refills: 1 | Status: SHIPPED
Start: 2020-03-05 | End: 2020-08-24 | Stop reason: SDUPTHER

## 2020-03-05 NOTE — PROGRESS NOTES
3/5/2020     Marietta Armas (:  1935) is a 80 y.o. female, here for evaluation of the following medical concerns:  Presents for medication refills and an INR recheck. Last laboratory was 2019 with a hemoglobin A1c at 7.7. Chief Complaint   Patient presents with   [address] [place] [address] Office Visit for Anticoagulation Management         Review of Systems    Prior to Visit Medications    Medication Sig Taking? Authorizing Provider   allopurinol (ZYLOPRIM) 100 MG tablet TAKE 1 TABLET BY MOUTH ONCE DAILY Yes Tena Rodriguez DO   traMADol (ULTRAM) 50 MG tablet Take 1 tablet by mouth every 8 hours as needed for Pain for up to 30 days. Yes Tena Rodriguez DO   solifenacin (VESICARE) 5 MG tablet Take 1 tablet by mouth daily Yes Tena Rodriguez DO   digoxin (LANOXIN) 125 MCG tablet Take 1 tablet by mouth daily Yes Tena Rodriguez DO   simvastatin (ZOCOR) 80 MG tablet Take 1 tablet by mouth nightly Take by mouth Yes Tena Rodriguez DO   DULoxetine (CYMBALTA) 60 MG extended release capsule Take 1 capsule by mouth daily Yes Tena Rodriguez DO   ranitidine (ZANTAC) 150 MG tablet Take 1 tablet by mouth 2 times daily as needed for Heartburn Yes Tena Rodriguez DO   blood glucose test strips (RELION GLUCOSE TEST STRIPS) strip 1 each by In Vitro route daily As needed. Yes Tena Rodriguez DO   cloNIDine (CATAPRES) 0.1 MG tablet Take 1 tablet by mouth 2 times daily Yes Tena Rodriguez DO   warfarin (COUMADIN) 4 MG tablet TAKE 1 TABLET BY MOUTH ON TUESDAY, THURSDAY AND SATURDAY. TAKE 2 TABLETS DAILY OTHERWISE.  Yes Tena Rodriguez DO   cloNIDine (CATAPRES) 0.1 MG tablet TAKE 1 TABLET BY MOUTH TWICE DAILY Yes Historical Provider, MD   metFORMIN (GLUCOPHAGE) 500 MG tablet Take 1 tablet by mouth nightly Yes Tena Rodriguez DO   aspirin (ECOTRIN LOW STRENGTH) 81 MG EC tablet Take by mouth Yes Historical Provider, MD   glimepiride (AMARYL) 1 MG

## 2020-04-08 ENCOUNTER — TELEPHONE (OUTPATIENT)
Dept: ADMINISTRATIVE | Age: 85
End: 2020-04-08

## 2020-04-08 NOTE — TELEPHONE ENCOUNTER
Patient missed INR check yesterday. Please call her and advise if you want her to come in today.    Thanks

## 2020-04-09 ENCOUNTER — NURSE ONLY (OUTPATIENT)
Dept: PRIMARY CARE CLINIC | Age: 85
End: 2020-04-09
Payer: MEDICARE

## 2020-04-09 ENCOUNTER — ANTI-COAG VISIT (OUTPATIENT)
Dept: PRIMARY CARE CLINIC | Age: 85
End: 2020-04-09

## 2020-04-09 VITALS — TEMPERATURE: 98.1 F

## 2020-04-09 LAB
INTERNATIONAL NORMALIZATION RATIO, POC: 3.4
PROTHROMBIN TIME, POC: NORMAL

## 2020-04-09 PROCEDURE — 85610 PROTHROMBIN TIME: CPT | Performed by: INTERNAL MEDICINE

## 2020-04-09 PROCEDURE — 93793 ANTICOAG MGMT PT WARFARIN: CPT | Performed by: INTERNAL MEDICINE

## 2020-04-13 RX ORDER — METOPROLOL TARTRATE 50 MG/1
TABLET, FILM COATED ORAL
Qty: 180 TABLET | Refills: 0 | Status: SHIPPED
Start: 2020-04-13 | End: 2020-04-20

## 2020-04-13 RX ORDER — SIMVASTATIN 80 MG
TABLET ORAL
Qty: 90 TABLET | Refills: 0 | Status: SHIPPED
Start: 2020-04-13 | End: 2020-04-20

## 2020-04-13 RX ORDER — DULOXETIN HYDROCHLORIDE 60 MG/1
CAPSULE, DELAYED RELEASE ORAL
Qty: 90 CAPSULE | Refills: 0 | Status: SHIPPED
Start: 2020-04-13 | End: 2020-04-20

## 2020-04-20 RX ORDER — DULOXETIN HYDROCHLORIDE 60 MG/1
CAPSULE, DELAYED RELEASE ORAL
Qty: 90 CAPSULE | Refills: 0 | Status: SHIPPED
Start: 2020-04-20 | End: 2020-07-28

## 2020-04-20 RX ORDER — SIMVASTATIN 80 MG
TABLET ORAL
Qty: 90 TABLET | Refills: 0 | Status: SHIPPED
Start: 2020-04-20 | End: 2020-07-28

## 2020-04-20 RX ORDER — METOPROLOL TARTRATE 50 MG/1
TABLET, FILM COATED ORAL
Qty: 180 TABLET | Refills: 0 | Status: SHIPPED
Start: 2020-04-20 | End: 2020-06-15 | Stop reason: SDUPTHER

## 2020-05-18 ENCOUNTER — HOSPITAL ENCOUNTER (OUTPATIENT)
Age: 85
Discharge: HOME | End: 2020-05-20
Payer: MEDICARE

## 2020-05-18 ENCOUNTER — OFFICE VISIT (OUTPATIENT)
Dept: FAMILY MEDICINE CLINIC | Age: 85
End: 2020-05-18
Payer: MEDICARE

## 2020-05-18 VITALS
SYSTOLIC BLOOD PRESSURE: 146 MMHG | TEMPERATURE: 97.3 F | HEART RATE: 48 BPM | OXYGEN SATURATION: 98 % | DIASTOLIC BLOOD PRESSURE: 72 MMHG

## 2020-05-18 LAB
ALBUMIN SERPL-MCNC: 4.3 G/DL (ref 3.5–5.2)
ALP BLD-CCNC: 101 U/L (ref 35–104)
ALT SERPL-CCNC: 13 U/L (ref 0–32)
ANION GAP SERPL CALCULATED.3IONS-SCNC: 19 MMOL/L (ref 7–16)
AST SERPL-CCNC: 18 U/L (ref 0–31)
BASOPHILS ABSOLUTE: 0.06 E9/L (ref 0–0.2)
BASOPHILS RELATIVE PERCENT: 0.5 % (ref 0–2)
BILIRUB SERPL-MCNC: 0.6 MG/DL (ref 0–1.2)
BILIRUBIN DIRECT: <0.2 MG/DL (ref 0–0.3)
BILIRUBIN, INDIRECT: NORMAL MG/DL (ref 0–1)
BUN BLDV-MCNC: 16 MG/DL (ref 8–23)
CALCIUM SERPL-MCNC: 9.9 MG/DL (ref 8.6–10.2)
CHLORIDE BLD-SCNC: 100 MMOL/L (ref 98–107)
CHOLESTEROL, TOTAL: 186 MG/DL (ref 0–199)
CO2: 23 MMOL/L (ref 22–29)
CREAT SERPL-MCNC: 0.8 MG/DL (ref 0.5–1)
EOSINOPHILS ABSOLUTE: 0.17 E9/L (ref 0.05–0.5)
EOSINOPHILS RELATIVE PERCENT: 1.5 % (ref 0–6)
GFR AFRICAN AMERICAN: >60
GFR NON-AFRICAN AMERICAN: >60 ML/MIN/1.73
GLUCOSE BLD-MCNC: 228 MG/DL (ref 74–99)
HBA1C MFR BLD: 8.1 % (ref 4–5.6)
HCT VFR BLD CALC: 49.5 % (ref 34–48)
HDLC SERPL-MCNC: 34 MG/DL
HEMOGLOBIN: 15.3 G/DL (ref 11.5–15.5)
IMMATURE GRANULOCYTES #: 0.05 E9/L
IMMATURE GRANULOCYTES %: 0.5 % (ref 0–5)
INTERNATIONAL NORMALIZATION RATIO, POC: 3.1
LDL CHOLESTEROL CALCULATED: 76 MG/DL (ref 0–99)
LYMPHOCYTES ABSOLUTE: 2.16 E9/L (ref 1.5–4)
LYMPHOCYTES RELATIVE PERCENT: 19.7 % (ref 20–42)
MCH RBC QN AUTO: 28.3 PG (ref 26–35)
MCHC RBC AUTO-ENTMCNC: 30.9 % (ref 32–34.5)
MCV RBC AUTO: 91.7 FL (ref 80–99.9)
MONOCYTES ABSOLUTE: 0.93 E9/L (ref 0.1–0.95)
MONOCYTES RELATIVE PERCENT: 8.5 % (ref 2–12)
NEUTROPHILS ABSOLUTE: 7.61 E9/L (ref 1.8–7.3)
NEUTROPHILS RELATIVE PERCENT: 69.3 % (ref 43–80)
PDW BLD-RTO: 14 FL (ref 11.5–15)
PHOSPHORUS: 3.1 MG/DL (ref 2.5–4.5)
PLATELET # BLD: 292 E9/L (ref 130–450)
PMV BLD AUTO: 10.8 FL (ref 7–12)
POTASSIUM SERPL-SCNC: 4.7 MMOL/L (ref 3.5–5)
PROTHROMBIN TIME, POC: 36.7
RBC # BLD: 5.4 E12/L (ref 3.5–5.5)
SODIUM BLD-SCNC: 142 MMOL/L (ref 132–146)
TOTAL PROTEIN: 7.3 G/DL (ref 6.4–8.3)
TRIGL SERPL-MCNC: 378 MG/DL (ref 0–149)
VLDLC SERPL CALC-MCNC: 76 MG/DL
WBC # BLD: 11 E9/L (ref 4.5–11.5)

## 2020-05-18 PROCEDURE — 82248 BILIRUBIN DIRECT: CPT

## 2020-05-18 PROCEDURE — 84460 ALANINE AMINO (ALT) (SGPT): CPT

## 2020-05-18 PROCEDURE — 84075 ASSAY ALKALINE PHOSPHATASE: CPT

## 2020-05-18 PROCEDURE — 82247 BILIRUBIN TOTAL: CPT

## 2020-05-18 PROCEDURE — 84450 TRANSFERASE (AST) (SGOT): CPT

## 2020-05-18 PROCEDURE — 80061 LIPID PANEL: CPT

## 2020-05-18 PROCEDURE — 80069 RENAL FUNCTION PANEL: CPT

## 2020-05-18 PROCEDURE — 84155 ASSAY OF PROTEIN SERUM: CPT

## 2020-05-18 PROCEDURE — 99213 OFFICE O/P EST LOW 20 MIN: CPT | Performed by: INTERNAL MEDICINE

## 2020-05-18 PROCEDURE — 85610 PROTHROMBIN TIME: CPT | Performed by: INTERNAL MEDICINE

## 2020-05-18 PROCEDURE — 36415 COLL VENOUS BLD VENIPUNCTURE: CPT

## 2020-05-18 PROCEDURE — 83036 HEMOGLOBIN GLYCOSYLATED A1C: CPT

## 2020-05-18 PROCEDURE — 85025 COMPLETE CBC W/AUTO DIFF WBC: CPT

## 2020-05-18 RX ORDER — DIGOXIN 125 MCG
125 TABLET ORAL DAILY
Qty: 90 TABLET | Refills: 1 | Status: SHIPPED | OUTPATIENT
Start: 2020-05-18

## 2020-05-18 RX ORDER — CIMETIDINE 400 MG/1
800 TABLET, FILM COATED ORAL 2 TIMES DAILY
Qty: 60 TABLET | Refills: 1 | Status: SHIPPED
Start: 2020-05-18 | End: 2020-10-02

## 2020-05-18 RX ORDER — SOLIFENACIN SUCCINATE 5 MG/1
5 TABLET, FILM COATED ORAL DAILY
Qty: 30 TABLET | Refills: 5 | Status: SHIPPED | OUTPATIENT
Start: 2020-05-18 | End: 2021-05-18

## 2020-05-18 RX ORDER — RANITIDINE 150 MG/1
150 TABLET ORAL 2 TIMES DAILY PRN
Qty: 60 TABLET | Refills: 0 | Status: CANCELLED | OUTPATIENT
Start: 2020-05-18

## 2020-05-18 NOTE — PROGRESS NOTES
therapy. She is to continue to treat her allergic rhinitis with Claritin. She is not able to obtain the Zantac due to his removal from the market. I placed her on cimetidine 400 mg twice daily instead. I have asked her not to use anything over-the-counter otherwise. No follow-ups on file. An electronic signature was used to authenticate this note.     --Lexy Michaels, DO on 5/18/2020 at 1:30 PM

## 2020-06-04 ENCOUNTER — TELEPHONE (OUTPATIENT)
Dept: FAMILY MEDICINE CLINIC | Age: 85
End: 2020-06-04

## 2020-06-15 ENCOUNTER — OFFICE VISIT (OUTPATIENT)
Dept: FAMILY MEDICINE CLINIC | Age: 85
End: 2020-06-15
Payer: MEDICARE

## 2020-06-15 VITALS
TEMPERATURE: 98 F | DIASTOLIC BLOOD PRESSURE: 74 MMHG | BODY MASS INDEX: 28.67 KG/M2 | SYSTOLIC BLOOD PRESSURE: 144 MMHG | WEIGHT: 167 LBS | OXYGEN SATURATION: 99 % | HEART RATE: 43 BPM

## 2020-06-15 LAB
INTERNATIONAL NORMALIZATION RATIO, POC: 3.7
PROTHROMBIN TIME, POC: 44.9

## 2020-06-15 PROCEDURE — 99214 OFFICE O/P EST MOD 30 MIN: CPT | Performed by: INTERNAL MEDICINE

## 2020-06-15 PROCEDURE — 3052F HG A1C>EQUAL 8.0%<EQUAL 9.0%: CPT | Performed by: INTERNAL MEDICINE

## 2020-06-15 PROCEDURE — 85610 PROTHROMBIN TIME: CPT | Performed by: INTERNAL MEDICINE

## 2020-06-15 RX ORDER — METOPROLOL TARTRATE 50 MG/1
25 TABLET, FILM COATED ORAL 2 TIMES DAILY
Qty: 180 TABLET | Refills: 2
Start: 2020-06-15 | End: 2020-09-28

## 2020-07-10 ENCOUNTER — TELEPHONE (OUTPATIENT)
Dept: ADMINISTRATIVE | Age: 85
End: 2020-07-10

## 2020-07-10 NOTE — TELEPHONE ENCOUNTER
Patient forgot she had appt yesterday, she wants to know if she has to come in for sure. She wants to know if she needs blood checked? Please call her and advise.  Thanks

## 2020-07-13 ENCOUNTER — OFFICE VISIT (OUTPATIENT)
Dept: FAMILY MEDICINE CLINIC | Age: 85
End: 2020-07-13
Payer: MEDICARE

## 2020-07-13 VITALS
SYSTOLIC BLOOD PRESSURE: 130 MMHG | OXYGEN SATURATION: 98 % | TEMPERATURE: 97.7 F | HEART RATE: 60 BPM | DIASTOLIC BLOOD PRESSURE: 70 MMHG | WEIGHT: 178 LBS | BODY MASS INDEX: 30.55 KG/M2

## 2020-07-13 LAB
INTERNATIONAL NORMALIZATION RATIO, POC: 1.6
PROTHROMBIN TIME, POC: 18.9

## 2020-07-13 PROCEDURE — 99213 OFFICE O/P EST LOW 20 MIN: CPT | Performed by: INTERNAL MEDICINE

## 2020-07-13 PROCEDURE — 85610 PROTHROMBIN TIME: CPT | Performed by: INTERNAL MEDICINE

## 2020-07-13 NOTE — PROGRESS NOTES
2020     Roxanne Chaves (:  1935) is a 80 y.o. female, here for recheck of her INR. She forgot again last week presents today. Admits she is eating a lot of vegetables did not there is a uncooked vegetables still had the same amount of vitamin K as the clock which confirmed that it does. She did not need any other medication refills. I did write down all of her instructions today. Chief Complaint   Patient presents with    Hypertension    Anticoagulation         Review of Systems    Prior to Visit Medications    Medication Sig Taking? Authorizing Provider   metoprolol tartrate (LOPRESSOR) 50 MG tablet Take 0.5 tablets by mouth 2 times daily Yes Lisseth Heart, DO   digoxin (LANOXIN) 125 MCG tablet Take 1 tablet by mouth daily Yes Lisseth Heart, DO   metFORMIN (GLUCOPHAGE) 500 MG tablet Take 1 tablet by mouth nightly Yes Lisseth Heart, DO   solifenacin (VESICARE) 5 MG tablet Take 1 tablet by mouth daily Yes Lisseth Heart, DO   cimetidine (TAGAMET) 400 MG tablet Take 2 tablets by mouth 2 times daily Yes Lisseth Heart, DO   simvastatin (ZOCOR) 80 MG tablet Take 1 tablet by mouth nightly Yes Lisseth Heart, DO   DULoxetine (CYMBALTA) 60 MG extended release capsule Take 1 capsule by mouth once daily Yes Lisseth Heart, DO   allopurinol (ZYLOPRIM) 100 MG tablet TAKE 1 TABLET BY MOUTH ONCE DAILY Yes Lisseth Heart, DO   ranitidine (ZANTAC) 150 MG tablet Take 1 tablet by mouth 2 times daily as needed for Heartburn Yes Lisseth Heart, DO   blood glucose test strips (RELION GLUCOSE TEST STRIPS) strip 1 each by In Vitro route daily As needed. Yes Lisseth Heart, DO   cloNIDine (CATAPRES) 0.1 MG tablet Take 1 tablet by mouth 2 times daily Yes Lisseth Heart, DO   warfarin (COUMADIN) 4 MG tablet TAKE 1 TABLET BY MOUTH ON TUESDAY, THURSDAY AND SATURDAY. TAKE 2 TABLETS DAILY OTHERWISE.  Yes Lisseth Heart, DO   cloNIDine (CATAPRES) 0.1 MG tablet TAKE 1 TABLET BY MOUTH TWICE DAILY Yes Historical Provider, MD   aspirin (ECOTRIN LOW STRENGTH) 81 MG EC tablet Take by mouth Yes Historical Provider, MD   glimepiride (AMARYL) 1 MG tablet  Yes Historical Provider, MD   LUTEIN PO Take by mouth Yes Historical Provider, MD   niacin (SLO-NIACIN) 500 MG extended release tablet Take by mouth Yes Historical Provider, MD   traMADol (ULTRAM) 50 MG tablet Take 1 tablet by mouth every 8 hours as needed for Pain for up to 30 days. [place] [facility], DO      Allergies   Allergen Reactions    Latex     Adhesive Tape Rash     Skin blister/\tearing       Past Medical History:   Diagnosis Date    Anxiety     Arrhythmia     PSVT vs A fib    Atrial fibrillation ([facility] [address]     CAD (coronary artery disease)     Diverticulosis     Hyperlipidemia     Hypertension     Osteoarthritis     Post herpetic neuralgia 2004    Scoliosis     Lumbar     Past Surgical History:   Procedure Laterality Date    ANGIOPLASTY  2003    Stent placement     HEMORRHOID SURGERY      HYSTERECTOMY, TOTAL ABDOMINAL      JOINT REPLACEMENT Left 2010    shoulder    LAMINECTOMY  1989    L4-L5    TOTAL HIP ARTHROPLASTY Left       Social History     Tobacco Use    Smoking status: Former Smoker     Types: Cigarettes     Last attempt to quit: 1974     Years since quittin.5    Smokeless tobacco: Never Used   Substance Use Topics    Alcohol use: Never     Frequency: Never        Vitals:    20 1418   BP: 130/70   Pulse: 60   Temp: 97.7 °F (36.5 °C)   SpO2: 98%   Weight: 178 lb (80.7 kg)     Estimated body mass index is 30.55 kg/m² as calculated from the following:    Height as of 3/5/20: 5' 4\" (1.626 m). Weight as of this encounter: 178 lb (80.7 kg). Physical Exam  Constitutional:       Appearance: Normal appearance. HENT:      Head: Normocephalic and atraumatic.       Right Ear: Tympanic membrane, ear canal and external ear normal.      Left Ear: Tympanic membrane, ear canal and external ear normal.      Nose: Nose normal.      Mouth/Throat:      Mouth: Mucous membranes are moist.   Eyes:      Extraocular Movements: Extraocular movements intact. Conjunctiva/sclera: Conjunctivae normal.      Pupils: Pupils are equal, round, and reactive to light. Cardiovascular:      Rate and Rhythm: Normal rate. Rhythm irregular. Pulses: Normal pulses. Pulmonary:      Breath sounds: No wheezing, rhonchi or rales. Musculoskeletal:      Right lower leg: No edema. Left lower leg: No edema. Skin:     General: Skin is warm and dry. Neurological:      General: No focal deficit present. Mental Status: She is alert and oriented to person, place, and time. Motor: No weakness. Gait: Gait normal.   Psychiatric:         Mood and Affect: Mood normal.         Behavior: Behavior normal.     INR 1.6    ASSESSMENT/PLAN:  Lillie Waite was seen today for hypertension and anticoagulation. Diagnoses and all orders for this visit:    Anticoagulant long-term use  -     POCT INR       I recommended rechecking the INR in 1 week. A new set of instructions was given to the patient today for her Coumadin therapy. She is to take 6 mg today and then 4 mg every day. Recheck in 8 days    An electronic signature was used to authenticate this note.     --Juliann Foley DO on 7/13/2020 at 2:51 PM

## 2020-07-17 ENCOUNTER — TELEPHONE (OUTPATIENT)
Dept: FAMILY MEDICINE CLINIC | Age: 85
End: 2020-07-17

## 2020-07-17 NOTE — TELEPHONE ENCOUNTER
She is calling to tell you that since you increased the does of her coumadin she has had some rectal bleeding when having a bowel movement. She is not in any pain and it only happens when she has a BM. She sees you on Monday and will go to the er over the weekend if this increases.

## 2020-07-21 ENCOUNTER — OFFICE VISIT (OUTPATIENT)
Dept: FAMILY MEDICINE CLINIC | Age: 85
End: 2020-07-21
Payer: MEDICARE

## 2020-07-21 VITALS
OXYGEN SATURATION: 94 % | DIASTOLIC BLOOD PRESSURE: 50 MMHG | BODY MASS INDEX: 30.55 KG/M2 | SYSTOLIC BLOOD PRESSURE: 158 MMHG | HEIGHT: 64 IN | HEART RATE: 59 BPM

## 2020-07-21 LAB
INTERNATIONAL NORMALIZATION RATIO, POC: 3.2
PROTHROMBIN TIME, POC: 38.8

## 2020-07-21 PROCEDURE — 99212 OFFICE O/P EST SF 10 MIN: CPT | Performed by: INTERNAL MEDICINE

## 2020-07-21 PROCEDURE — 85610 PROTHROMBIN TIME: CPT | Performed by: INTERNAL MEDICINE

## 2020-07-21 NOTE — PROGRESS NOTES
2020     Brendan Marquez (:  1935) is a 80 y.o. female, here for evaluation of the following medical concerns:  Last week her INR was 1.6. Changes were made to her warfarin therapy for just 2 days. She presents today for recheck of her INR. Chief Complaint   Patient presents with    Atrial Fibrillation     1 wk INR         Review of Systems    Prior to Visit Medications    Medication Sig Taking? Authorizing Provider   metoprolol tartrate (LOPRESSOR) 50 MG tablet Take 0.5 tablets by mouth 2 times daily Yes Rudi Johnson DO   digoxin (LANOXIN) 125 MCG tablet Take 1 tablet by mouth daily Yes Rudi Johnson DO   metFORMIN (GLUCOPHAGE) 500 MG tablet Take 1 tablet by mouth nightly Yes Rudi Johnson DO   solifenacin (VESICARE) 5 MG tablet Take 1 tablet by mouth daily Yes Rudi Johnson DO   cimetidine (TAGAMET) 400 MG tablet Take 2 tablets by mouth 2 times daily Yes Rudi Johnson DO   simvastatin (ZOCOR) 80 MG tablet Take 1 tablet by mouth nightly Yes Rudi Johnson DO   DULoxetine (CYMBALTA) 60 MG extended release capsule Take 1 capsule by mouth once daily Yes Rudi Johnson DO   allopurinol (ZYLOPRIM) 100 MG tablet TAKE 1 TABLET BY MOUTH ONCE DAILY Yes Rudi Johnson DO   ranitidine (ZANTAC) 150 MG tablet Take 1 tablet by mouth 2 times daily as needed for Heartburn Yes Rudi Johnson, DO   blood glucose test strips (RELION GLUCOSE TEST STRIPS) strip 1 each by In Vitro route daily As needed. Yes Rudi Johnson DO   cloNIDine (CATAPRES) 0.1 MG tablet Take 1 tablet by mouth 2 times daily Yes Rudi Johnson DO   warfarin (COUMADIN) 4 MG tablet TAKE 1 TABLET BY MOUTH ON TUESDAY, THURSDAY AND SATURDAY. TAKE 2 TABLETS DAILY OTHERWISE.  Yes Rudi Johnson DO   cloNIDine (CATAPRES) 0.1 MG tablet TAKE 1 TABLET BY MOUTH TWICE DAILY Yes Historical Provider, MD   aspirin (ECOTRIN LOW STRENGTH) 81 MG EC tablet Take by mouth Yes Historical Provider, MD   glimepiride (AMARYL) 1 MG tablet  Yes Historical Provider, MD   LUTEIN PO Take by mouth Yes Historical Provider, MD   niacin (SLO-NIACIN) 500 MG extended release tablet Take by mouth Yes Historical Provider, MD   traMADol (ULTRAM) 50 MG tablet Take 1 tablet by mouth every 8 hours as needed for Pain for up to 30 days. Rigoberto Banerjee,       Allergies   Allergen Reactions    Latex     Adhesive Tape Rash     Skin blister/\tearing       Past Medical History:   Diagnosis Date    Anxiety     Arrhythmia     PSVT vs A fib    Atrial fibrillation ([facility] [address] 2009    CAD (coronary artery disease)     Diverticulosis     Hyperlipidemia     Hypertension     Osteoarthritis     Post herpetic neuralgia 2004    Scoliosis     Lumbar     Past Surgical History:   Procedure Laterality Date    ANGIOPLASTY  2003    Stent placement     HEMORRHOID SURGERY      HYSTERECTOMY, TOTAL ABDOMINAL      JOINT REPLACEMENT Left 2010    shoulder    LAMINECTOMY  1989    L4-L5    TOTAL HIP ARTHROPLASTY Left       Social History     Tobacco Use    Smoking status: Former Smoker     Types: Cigarettes     Last attempt to quit: 1974     Years since quittin.5    Smokeless tobacco: Never Used   Substance Use Topics    Alcohol use: Never     Frequency: Never        Vitals:    20 1454   BP: (!) 158/50   Pulse: 59   SpO2: 94%   Height: 5' 4\" (1.626 m)     Estimated body mass index is 30.55 kg/m² as calculated from the following:    Height as of this encounter: 5' 4\" (1.626 m). Weight as of 20: 178 lb (80.7 kg). Physical Exam  Constitutional:       Appearance: Normal appearance. HENT:      Head: Normocephalic. Cardiovascular:      Rate and Rhythm: Normal rate. Rhythm irregular. Pulmonary:      Effort: Pulmonary effort is normal.      Breath sounds: Normal breath sounds. No wheezing, rhonchi or rales. Musculoskeletal:      Right lower leg: No edema.       Left lower leg: No edema. Neurological:      Mental Status: She is alert. Gait: Gait normal.         INR: 3.2  ASSESSMENT/PLAN:  Penny Borjas was seen today for atrial fibrillation. Diagnoses and all orders for this visit:    Chronic atrial fibrillation    Anticoagulant long-term use  -     POCT INR       No changes recheck in 1 month. Continue Coumadin 4 mg daily. An electronic signature was used to authenticate this note.     --Mary Kate Malcolm, DO on 7/21/2020 at 3:09 PM

## 2020-07-28 RX ORDER — DULOXETIN HYDROCHLORIDE 60 MG/1
CAPSULE, DELAYED RELEASE ORAL
Qty: 90 CAPSULE | Refills: 1 | Status: SHIPPED | OUTPATIENT
Start: 2020-07-28

## 2020-07-28 RX ORDER — WARFARIN SODIUM 4 MG/1
TABLET ORAL
Qty: 36 TABLET | Refills: 5 | Status: SHIPPED | OUTPATIENT
Start: 2020-07-28

## 2020-07-28 RX ORDER — SIMVASTATIN 80 MG
TABLET ORAL
Qty: 90 TABLET | Refills: 1 | Status: SHIPPED | OUTPATIENT
Start: 2020-07-28

## 2020-08-24 ENCOUNTER — OFFICE VISIT (OUTPATIENT)
Dept: FAMILY MEDICINE CLINIC | Age: 85
End: 2020-08-24
Payer: MEDICARE

## 2020-08-24 VITALS
OXYGEN SATURATION: 97 % | BODY MASS INDEX: 28.67 KG/M2 | SYSTOLIC BLOOD PRESSURE: 160 MMHG | DIASTOLIC BLOOD PRESSURE: 80 MMHG | HEART RATE: 62 BPM | WEIGHT: 167 LBS | TEMPERATURE: 98.4 F

## 2020-08-24 LAB
INTERNATIONAL NORMALIZATION RATIO, POC: 2.1
PROTHROMBIN TIME, POC: 25.8

## 2020-08-24 PROCEDURE — G0438 PPPS, INITIAL VISIT: HCPCS | Performed by: INTERNAL MEDICINE

## 2020-08-24 PROCEDURE — 85610 PROTHROMBIN TIME: CPT | Performed by: INTERNAL MEDICINE

## 2020-08-24 RX ORDER — ALLOPURINOL 100 MG/1
TABLET ORAL
Qty: 90 TABLET | Refills: 1 | Status: SHIPPED | OUTPATIENT
Start: 2020-08-24

## 2020-08-24 ASSESSMENT — PATIENT HEALTH QUESTIONNAIRE - PHQ9
SUM OF ALL RESPONSES TO PHQ QUESTIONS 1-9: 0
SUM OF ALL RESPONSES TO PHQ QUESTIONS 1-9: 0

## 2020-08-24 ASSESSMENT — LIFESTYLE VARIABLES: HOW OFTEN DO YOU HAVE A DRINK CONTAINING ALCOHOL: 0

## 2020-08-24 NOTE — PROGRESS NOTES
2020     Remi Saint (:  1935) is a 80 y.o. female, presents today for refill of medication as well as a recheck of his INR. She is under some stress today as her son was involved in a bad motor vehicle accident. She has not missed any doses of her Coumadin. Today it is 2.1. Chief Complaint   Patient presents with    Medicare AWV         Review of Systems    Prior to Visit Medications    Medication Sig Taking? Authorizing Provider   metFORMIN (GLUCOPHAGE) 500 MG tablet Take 1 tablet by mouth nightly Yes Reuben Farfan, DO   DULoxetine (CYMBALTA) 60 MG extended release capsule Take 1 capsule by mouth once daily Yes Reuben Farfan, DO   simvastatin (ZOCOR) 80 MG tablet Take 1 tablet by mouth nightly Yes Reuben Farfan, DO   warfarin (COUMADIN) 4 MG tablet TAKE 1 TABLET BY MOUTH ONCE DAILY ON  TUESDAY,  THURSDAY,  AND  SATURDAY. TAKE  2  TABLETS  DAILY  OTHERWISE Yes Reuben Farfan, DO   metoprolol tartrate (LOPRESSOR) 50 MG tablet Take 0.5 tablets by mouth 2 times daily Yes Reuben Farfan, DO   digoxin (LANOXIN) 125 MCG tablet Take 1 tablet by mouth daily Yes Reuben Farfan, DO   solifenacin (VESICARE) 5 MG tablet Take 1 tablet by mouth daily Yes Reuben Farfan, DO   allopurinol (ZYLOPRIM) 100 MG tablet TAKE 1 TABLET BY MOUTH ONCE DAILY Yes Reuben Farfan, DO   traMADol (ULTRAM) 50 MG tablet Take 1 tablet by mouth every 8 hours as needed for Pain for up to 30 days.  Yes Reuben Farfan, DO   cloNIDine (CATAPRES) 0.1 MG tablet TAKE 1 TABLET BY MOUTH TWICE DAILY Yes Historical Provider, MD   aspirin (ECOTRIN LOW STRENGTH) 81 MG EC tablet Take by mouth Yes Historical Provider, MD   glimepiride (AMARYL) 1 MG tablet  Yes Historical Provider, MD   cimetidine (TAGAMET) 400 MG tablet Take 2 tablets by mouth 2 times daily  Reuben Farfan, DO   blood glucose test strips (RELION GLUCOSE TEST STRIPS) strip 1 each by In Vitro route daily As needed. Haskel Mode, DO   cloNIDine (CATAPRES) 0.1 MG tablet Take 1 tablet by mouth 2 times daily  Haskel Mode, DO   LUTEIN PO Take by mouth  Historical Provider, MD   niacin (SLO-NIACIN) 500 MG extended release tablet Take by mouth  Historical Provider, MD      Allergies   Allergen Reactions    Latex     Adhesive Tape Rash     Skin blister/\tearing       Past Medical History:   Diagnosis Date    Anxiety     Arrhythmia     PSVT vs A fib    Atrial fibrillation ([address]     CAD (coronary artery disease)     Diverticulosis     Hyperlipidemia     Hypertension     Osteoarthritis     Post herpetic neuralgia 2004    Scoliosis     Lumbar     Past Surgical History:   Procedure Laterality Date    ANGIOPLASTY  2003    Stent placement     HEMORRHOID SURGERY  2002    HYSTERECTOMY, TOTAL ABDOMINAL      JOINT REPLACEMENT Left 2010    shoulder    LAMINECTOMY  1989    L4-L5    TOTAL HIP ARTHROPLASTY Left       Social History     Tobacco Use    Smoking status: Former Smoker     Types: Cigarettes     Last attempt to quit: 1974     Years since quittin.6    Smokeless tobacco: Never Used   Substance Use Topics    Alcohol use: Never     Frequency: Never        Vitals:    20 1517   BP: (!) 160/80   Pulse: 62   Temp: 98.4 °F (36.9 °C)   SpO2: 97%   Weight: 167 lb (75.8 kg)     Estimated body mass index is 28.67 kg/m² as calculated from the following:    Height as of 20: 5' 4\" (1.626 m). Weight as of this encounter: 167 lb (75.8 kg). Physical Exam  Constitutional:       Appearance: Normal appearance. She is not toxic-appearing. HENT:      Head: Normocephalic. Right Ear: Tympanic membrane and ear canal normal.      Left Ear: Tympanic membrane and ear canal normal.   Eyes:      Extraocular Movements: Extraocular movements intact. Conjunctiva/sclera: Conjunctivae normal.      Pupils: Pupils are equal, round, and reactive to light.    Cardiovascular:      Rate and Rhythm: Normal rate. Rhythm irregular. Pulmonary:      Effort: Pulmonary effort is normal.      Breath sounds: No wheezing, rhonchi or rales. Musculoskeletal:      Right lower leg: No edema. Left lower leg: No edema. Lymphadenopathy:      Cervical: No cervical adenopathy. Skin:     General: Skin is warm and dry. Capillary Refill: Capillary refill takes less than 2 seconds. Neurological:      Mental Status: She is alert. Gait: Gait normal.   Psychiatric:         Mood and Affect: Mood normal.         Behavior: Behavior normal.          INR is 2.1  ASSESSMENT/PLAN:  Antoni hinton was seen today for medicare awv. Diagnoses and all orders for this visit:    Chronic atrial fibrillation    Anticoagulant long-term use  -     POCT INR       No changes in warfarin therapy as it is currently therapeutic    An electronic signature was used to authenticate this note. --Juliann Foley DO on 2020 at 3:51 PM  Medicare Annual Wellness Visit  Name: Camilo [place] Date: 2020   MRN: [id number] Sex: Female   Age: 80 y.o. Ethnicity: Non-/Non    : 1935 Race: Arcierra Mccann is here for Medicare AWV    Screenings for behavioral, psychosocial and functional/safety risks, and cognitive dysfunction are all negative except as indicated below. These results, as well as other patient data from the [address] form, are documented in Flowsheets linked to this Encounter. Allergies   Allergen Reactions    Latex     Adhesive Tape Rash     Skin blister/\tearing         Prior to Visit Medications    Medication Sig Taking?  Authorizing Provider   metFORMIN (GLUCOPHAGE) 500 MG tablet Take 1 tablet by mouth nightly Yes Juliann Foley DO   DULoxetine (CYMBALTA) 60 MG extended release capsule Take 1 capsule by mouth once daily Yes Juliann Foley DO   simvastatin (ZOCOR) 80 MG tablet Take 1 tablet by mouth nightly Yes Mike Hurst Left 2010    shoulder    LAMINECTOMY  1989    L4-L5    TOTAL HIP ARTHROPLASTY Left 2002         Family History   Problem Relation Age of Onset    Diabetes Mother     Heart Disease Mother         Congestive Heart Failure    Diabetes Sister     Cancer Brother         Prostate       CareTeam (Including outside providers/suppliers regularly involved in providing care):   Patient Care Team:  Ayo Mcintyre DO as PCP - General (Internal Medicine)  Ayo Mcintyre DO as PCP - [facility] Empaneled Provider  Dr Shayy Mullins surgery  Dr Davide Carbajal-gastroenterology  Dr Reid Median Readings from Last 3 Encounters:   08/24/20 167 lb (75.8 kg)   07/13/20 178 lb (80.7 kg)   06/15/20 167 lb (75.8 kg)     Vitals:    08/24/20 1517   BP: (!) 160/80   Pulse: 62   Temp: 98.4 °F (36.9 °C)   SpO2: 97%   Weight: 167 lb (75.8 kg)     Body mass index is 28.67 kg/m². Based upon direct observation of the patient, evaluation of cognition reveals        Patient's complete Health Risk Assessment and screening values have been reviewed and are found in Flowsheets. The following problems were reviewed today and where indicated follow up appointments were made and/or referrals ordered. Positive Risk Factor Screenings with Interventions:     General Health:  General  In general, how would you say your health is?: Good  In the past 7 days, have you experienced any of the following?  New or Increased Pain, New or Increased Fatigue, Loneliness, Social Isolation, Stress or Anger?: (!) Stress  Do you get the social and emotional support that you need?: Yes  Do you have a Living Will?: Yes  General Health Risk Interventions:  ·     Health Habits/Nutrition:  Health Habits/Nutrition  Do you exercise for at least 20 minutes 2-3 times per week?: Yes  Have you lost any weight without trying in the past 3 months?: (!) Yes  Do you eat fewer than 2 meals per day?: No  Have you seen a dentist within the past year?: Yes  Body mass index is 28.67 kg/m². Health Habits/Nutrition Interventions:  ·     Hearing/Vision:  No exam data present  Hearing/Vision  Do you or your family notice any trouble with your hearing?: (!) Yes  Do you have difficulty driving, watching TV, or doing any of your daily activities because of your eyesight?: No  Have you had an eye exam within the past year?: Yes  Hearing/Vision Interventions:  ·     Safety:  Safety  Do you have working smoke detectors?: Yes  Have all throw rugs been removed or fastened?: (!) No  Do you have non-slip mats or surfaces in all bathtubs/showers?: Yes  Do all of your stairways have a railing or banister?: Yes  Are your doorways, halls and stairs free of clutter?: Yes  Do you always fasten your seatbelt when you are in a car?: Yes  Safety Interventions:  ·     ADL:  ADLs  In the past 7 days, did you need help from others to perform any of the following everyday activities? Eating, dressing, grooming, bathing, toileting, or walking/balance?: (!) Walking/Balance  In the past 7 days, did you need help from others to take care of any of the following?  Laundry, housekeeping, banking/finances, shopping, telephone use, food preparation, transportation, or taking medications?: [employer], Laundry  ADL Interventions:  ·     Personalized Preventive Plan   Current Health Maintenance Status  Immunization History   Administered Date(s) Administered    Influenza Virus Vaccine 10/29/2006, 10/29/2008, 12/16/2008, 02/02/2010, 10/20/2011, 09/17/2012, 09/16/2013, 10/15/2015, 11/07/2016, 10/20/2017    Influenza, High Dose (Fluzone 65 yrs and older) 11/08/2018    PPD Test 02/28/2012, 03/06/2012    Pneumococcal Conjugate 13-valent ([id number]) 03/05/2020    Pneumococcal Polysaccharide ([id number]) 02/02/2010    Tdap (Boostrix, Adacel) 10/26/2016        Health Maintenance   Topic Date Due    Shingles Vaccine (1 of 2) 01/16/1985    Annual Wellness Visit (AWV)

## 2020-09-28 RX ORDER — METOPROLOL TARTRATE 50 MG/1
TABLET, FILM COATED ORAL
Qty: 180 TABLET | Refills: 1 | Status: SHIPPED | OUTPATIENT
Start: 2020-09-28

## 2020-10-02 ENCOUNTER — OFFICE VISIT (OUTPATIENT)
Dept: FAMILY MEDICINE CLINIC | Age: 85
End: 2020-10-02
Payer: MEDICARE

## 2020-10-02 VITALS
RESPIRATION RATE: 16 BRPM | HEART RATE: 60 BPM | DIASTOLIC BLOOD PRESSURE: 70 MMHG | TEMPERATURE: 98.2 F | BODY MASS INDEX: 28.67 KG/M2 | OXYGEN SATURATION: 98 % | SYSTOLIC BLOOD PRESSURE: 140 MMHG | WEIGHT: 167 LBS

## 2020-10-02 LAB
INTERNATIONAL NORMALIZATION RATIO, POC: 2.6
PROTHROMBIN TIME, POC: 30.6

## 2020-10-02 PROCEDURE — 99214 OFFICE O/P EST MOD 30 MIN: CPT | Performed by: INTERNAL MEDICINE

## 2020-10-02 PROCEDURE — 85610 PROTHROMBIN TIME: CPT | Performed by: INTERNAL MEDICINE

## 2020-10-02 PROCEDURE — 3052F HG A1C>EQUAL 8.0%<EQUAL 9.0%: CPT | Performed by: INTERNAL MEDICINE

## 2020-10-02 NOTE — PATIENT INSTRUCTIONS
1.  Remove the Tegraderm bandage on the back on Tuesday Oct 8th  2. INR for the coumadin is theraputic at 2.6.   No changes in dosing and recheck the INR in 1 month

## 2020-10-02 NOTE — PROGRESS NOTES
10/2/2020     Kathleen Watts (:  1935) is a 80 y.o. female, here for check of the INR. She also has a painful area on her back that she wants me to fix immediately. She cannot wear her bra because of this. She said otherwise she is been feeling pretty good. No new complaints. Chief Complaint   Patient presents with   Lur[facility] See Office Visit for Anticoagulation Management         Review of Systems    Prior to Visit Medications    Medication Sig Taking? Authorizing Provider   metoprolol tartrate (LOPRESSOR) 50 MG tablet Take 1 tablet by mouth twice daily Yes Monica Counts, DO   allopurinol (ZYLOPRIM) 100 MG tablet TAKE 1 TABLET BY MOUTH ONCE DAILY Yes Monica Counts, DO   metFORMIN (GLUCOPHAGE) 500 MG tablet Take 1 tablet by mouth nightly Yes Monica Counts, DO   DULoxetine (CYMBALTA) 60 MG extended release capsule Take 1 capsule by mouth once daily Yes Monica Counts, DO   simvastatin (ZOCOR) 80 MG tablet Take 1 tablet by mouth nightly Yes Monica Counts, DO   warfarin (COUMADIN) 4 MG tablet TAKE 1 TABLET BY MOUTH ONCE DAILY ON  TUESDAY,  THURSDAY,  AND  SATURDAY. TAKE  2  TABLETS  DAILY  OTHERWISE Yes Monica Counts, DO   digoxin (LANOXIN) 125 MCG tablet Take 1 tablet by mouth daily Yes Monica Counts, DO   solifenacin (VESICARE) 5 MG tablet Take 1 tablet by mouth daily Yes Monica Counts, DO   cimetidine (TAGAMET) 400 MG tablet Take 2 tablets by mouth 2 times daily  Patient not taking: Reported on 10/2/2020  Monica Counts, DO   traMADol (ULTRAM) 50 MG tablet Take 1 tablet by mouth every 8 hours as needed for Pain for up to 30 days. Patient not taking: Reported on 10/2/2020  Monica Counts, DO   blood glucose test strips (RELION GLUCOSE TEST STRIPS) strip 1 each by In Vitro route daily As needed.   Monica Counts, DO   cloNIDine (CATAPRES) 0.1 MG tablet Take 1 tablet by mouth 2 times daily  Monica Counts, DO   cloNIDine (CATAPRES) 0.1 MG tablet TAKE 1 TABLET BY MOUTH TWICE DAILY  Historical Provider, MD   glimepiride (AMARYL) 1 MG tablet   Historical Provider, MD   LUTEIN PO Take by mouth  Historical Provider, MD   niacin (SLO-NIACIN) 500 MG extended release tablet Take by mouth  Historical Provider, MD      Allergies   Allergen Reactions    Latex     Adhesive Tape Rash     Skin blister/\tearing       Past Medical History:   Diagnosis Date    Anxiety     Arrhythmia     PSVT vs A fib    Atrial fibrillation ([address]     CAD (coronary artery disease)     Diverticulosis     Hyperlipidemia     Hypertension     Osteoarthritis     Post herpetic neuralgia 2004    Scoliosis     Lumbar     Past Surgical History:   Procedure Laterality Date    ANGIOPLASTY  2003    Stent placement     HEMORRHOID SURGERY  2002    HYSTERECTOMY, TOTAL ABDOMINAL      JOINT REPLACEMENT Left 2010    shoulder    LAMINECTOMY  1989    L4-L5    TOTAL HIP ARTHROPLASTY Left       Social History     Tobacco Use    Smoking status: Former Smoker     Types: Cigarettes     Last attempt to quit: 1974     Years since quittin.7    Smokeless tobacco: Never Used   Substance Use Topics    Alcohol use: Never     Frequency: Never        Vitals:    10/02/20 1354   BP: (!) 140/70   Pulse: 60   Resp: 16   Temp: 98.2 °F (36.8 °C)   SpO2: 98%   Weight: 167 lb (75.8 kg)     Estimated body mass index is 28.67 kg/m² as calculated from the following:    Height as of 20: 5' 4\" (1.626 m). Weight as of this encounter: 167 lb (75.8 kg). Physical Exam  Constitutional:       Appearance: Normal appearance. HENT:      Head: Normocephalic. Eyes:      Extraocular Movements: Extraocular movements intact. Conjunctiva/sclera: Conjunctivae normal.      Pupils: Pupils are equal, round, and reactive to light. Cardiovascular:      Rate and Rhythm: Normal rate. Rhythm irregular.    Pulmonary:      Effort: Pulmonary effort is normal.      Breath sounds: No wheezing, rhonchi or rales. Skin:     General: Skin is warm and dry. Comments: She has an area of intense sensitivity and pain on the right upper back. This is isolated to just the skin. It appears to be a traumatized seborrheic keratosis. There is actually 2 of these. She says is right where she wears her bra strap and can no longer wear the bra because of it. The epidermis is traumatized erythemic. There is no surrounding injection of the tissue. I insist with iodine but the lesion is extremely sensitive. Applied a silversorb NH and secured it with a Tegaderm bandage. This way she can get it wet. She may remove this in 5 days. Neurological:      Mental Status: She is alert. Psychiatric:         Mood and Affect: Mood normal.         Behavior: Behavior normal.         ASSESSMENT/PLAN:  Edgar Anguiano was seen today for office visit for anticoagulation management. Diagnoses and all orders for this visit:    Skin ulcer, limited to breakdown of skin ([address]    Anticoagulant long-term use  -     POCT INR       Her last regular laboratory performed was May 2020. This will be due next month. As noted above was applied to this lesion and can be removed in 5 days. Printed instructions were given to the patient today. An electronic signature was used to authenticate this note.     --Rona Montoya, DO on 10/2/2020 at 2:30 PM

## 2020-10-29 ENCOUNTER — OFFICE VISIT (OUTPATIENT)
Dept: FAMILY MEDICINE CLINIC | Age: 85
End: 2020-10-29
Payer: MEDICARE

## 2020-10-29 VITALS
WEIGHT: 168 LBS | RESPIRATION RATE: 16 BRPM | OXYGEN SATURATION: 98 % | BODY MASS INDEX: 28.84 KG/M2 | DIASTOLIC BLOOD PRESSURE: 80 MMHG | HEART RATE: 70 BPM | SYSTOLIC BLOOD PRESSURE: 142 MMHG | TEMPERATURE: 97.5 F

## 2020-10-29 LAB
INTERNATIONAL NORMALIZATION RATIO, POC: 2.6
PROTHROMBIN TIME, POC: 31

## 2020-10-29 PROCEDURE — 99213 OFFICE O/P EST LOW 20 MIN: CPT | Performed by: INTERNAL MEDICINE

## 2020-10-29 PROCEDURE — 85610 PROTHROMBIN TIME: CPT | Performed by: INTERNAL MEDICINE

## 2020-10-29 RX ORDER — CIMETIDINE 800 MG
800 TABLET ORAL 2 TIMES DAILY
Qty: 30 TABLET | Refills: 0 | Status: SHIPPED | OUTPATIENT
Start: 2020-10-29

## 2020-10-29 NOTE — PROGRESS NOTES
10/29/2020     Esther Salcido (:  1935) is a 80 y.o. female, here for evaluation of the following medical concerns:    Chief Complaint   Patient presents with    Office Visit for Anticoagulation Management         Review of Systems    Prior to Visit Medications    Medication Sig Taking? Authorizing Provider   cimetidine (TAGAMET) 800 MG tablet Take 1 tablet by mouth 2 times daily Yes Candida Bermeo DO   metoprolol tartrate (LOPRESSOR) 50 MG tablet Take 1 tablet by mouth twice daily Yes Candida Bermeo DO   allopurinol (ZYLOPRIM) 100 MG tablet TAKE 1 TABLET BY MOUTH ONCE DAILY Yes Candida Bermeo DO   metFORMIN (GLUCOPHAGE) 500 MG tablet Take 1 tablet by mouth nightly Yes Candida Bermeo DO   DULoxetine (CYMBALTA) 60 MG extended release capsule Take 1 capsule by mouth once daily Yes Candida Bermeo DO   simvastatin (ZOCOR) 80 MG tablet Take 1 tablet by mouth nightly Yes Candida Bermeo DO   warfarin (COUMADIN) 4 MG tablet TAKE 1 TABLET BY MOUTH ONCE DAILY ON  TUESDAY,  THURSDAY,  AND  SATURDAY. TAKE  2  TABLETS  DAILY  OTHERWISE Yes Candida Bermeo DO   digoxin (LANOXIN) 125 MCG tablet Take 1 tablet by mouth daily Yes Candida Bermeo DO   solifenacin (VESICARE) 5 MG tablet Take 1 tablet by mouth daily Yes Canddia Bermeo DO   cloNIDine (CATAPRES) 0.1 MG tablet TAKE 1 TABLET BY MOUTH TWICE DAILY Yes Historical Provider, MD   blood glucose test strips (RELION GLUCOSE TEST STRIPS) strip 1 each by In Vitro route daily As needed.   Candida Bermeo DO   cloNIDine (CATAPRES) 0.1 MG tablet Take 1 tablet by mouth 2 times daily  Candida Bermeo DO   glimepiride (AMARYL) 1 MG tablet   Historical Provider, MD   LUTEIN PO Take by mouth  Historical Provider, MD   niacin (SLO-NIACIN) 500 MG extended release tablet Take by mouth  Historical Provider, MD      Allergies   Allergen Reactions    Latex     Adhesive Tape Rash     Skin blister/\tearing Past Medical History:   Diagnosis Date    Anxiety     Arrhythmia     PSVT vs A fib    Atrial fibrillation ([address] 2009    CAD (coronary artery disease)     Diverticulosis     Hyperlipidemia     Hypertension     Osteoarthritis     Post herpetic neuralgia 2004    Scoliosis     Lumbar     Past Surgical History:   Procedure Laterality Date    ANGIOPLASTY  2003    Stent placement     HEMORRHOID SURGERY  2002    HYSTERECTOMY, TOTAL ABDOMINAL      JOINT REPLACEMENT Left 2010    shoulder    LAMINECTOMY  1989    L4-L5    TOTAL HIP ARTHROPLASTY Left       Social History     Tobacco Use    Smoking status: Former Smoker     Types: Cigarettes     Last attempt to quit: 1974     Years since quittin.8    Smokeless tobacco: Never Used   Substance Use Topics    Alcohol use: Never     Frequency: Never        Vitals:    10/29/20 1347   BP: (!) 180/80   Pulse: 70   Resp: 16   Temp: 97.5 °F (36.4 °C)   SpO2: 98%   Weight: 168 lb (76.2 kg)     Estimated body mass index is 28.84 kg/m² as calculated from the following:    Height as of 20: 5' 4\" (1.626 m). Weight as of this encounter: 168 lb (76.2 kg). Physical Exam  Constitutional:       Appearance: Normal appearance. HENT:      Head: Normocephalic and atraumatic. Mouth/Throat:      Mouth: Mucous membranes are moist.      Pharynx: Oropharynx is clear. Eyes:      Extraocular Movements: Extraocular movements intact. Conjunctiva/sclera: Conjunctivae normal.      Pupils: Pupils are equal, round, and reactive to light. Cardiovascular:      Rate and Rhythm: Normal rate. Rhythm irregular. Pulmonary:      Effort: Pulmonary effort is normal.      Breath sounds: No wheezing, rhonchi or rales. Lymphadenopathy:      Cervical: No cervical adenopathy. Neurological:      General: No focal deficit present. Mental Status: She is alert and oriented to person, place, and time.    Psychiatric:         Mood and Affect: Mood normal. INR is 2.6    ASSESSMENT/PLAN:  Maggie Tobin was seen today for office visit for anticoagulation management. Diagnoses and all orders for this visit:    Chronic atrial fibrillation (HCC)    Anticoagulant long-term use  -     POCT INR    Dyspepsia    Other orders  -     cimetidine (TAGAMET) 800 MG tablet; Take 1 tablet by mouth 2 times daily       No changes in warfarin therapy. She will continue at 4 mg daily. Recheck in 3 to 4 weeks. For her occasional dyspepsia given her Tagamet  . An electronic signature was used to authenticate this note.     --Kwan Browne DO on 10/29/2020 at 2:35 PM

## 2020-11-05 ENCOUNTER — TELEPHONE (OUTPATIENT)
Dept: FAMILY MEDICINE CLINIC | Age: 85
End: 2020-11-05

## 2020-11-05 NOTE — TELEPHONE ENCOUNTER
Pharmacist at [facility] calling about the interaction between the [address] and Coumadin. She is asking if you want to change her to Pepcid?

## 2020-11-06 ENCOUNTER — TELEPHONE (OUTPATIENT)
Dept: FAMILY MEDICINE CLINIC | Age: 85
End: 2020-11-06

## 2020-11-06 NOTE — TELEPHONE ENCOUNTER
[address] calling in and states drug interaction between cimetidine (TAGAMET) 800 MG tablet and warfarin (COUMADIN) 4 MG tablet.  Did you want to send a new script or monitor

## 2020-11-19 ENCOUNTER — OFFICE VISIT (OUTPATIENT)
Dept: FAMILY MEDICINE CLINIC | Age: 85
End: 2020-11-19
Payer: MEDICARE

## 2020-11-19 VITALS
RESPIRATION RATE: 16 BRPM | SYSTOLIC BLOOD PRESSURE: 150 MMHG | DIASTOLIC BLOOD PRESSURE: 80 MMHG | TEMPERATURE: 98.2 F | HEART RATE: 57 BPM | BODY MASS INDEX: 28.67 KG/M2 | WEIGHT: 167 LBS | OXYGEN SATURATION: 98 %

## 2020-11-19 LAB
INTERNATIONAL NORMALIZATION RATIO, POC: 2.4
PROTHROMBIN TIME, POC: 28.3

## 2020-11-19 PROCEDURE — 99212 OFFICE O/P EST SF 10 MIN: CPT | Performed by: INTERNAL MEDICINE

## 2020-11-19 PROCEDURE — G0008 ADMIN INFLUENZA VIRUS VAC: HCPCS | Performed by: INTERNAL MEDICINE

## 2020-11-19 PROCEDURE — 85610 PROTHROMBIN TIME: CPT | Performed by: INTERNAL MEDICINE

## 2020-11-19 PROCEDURE — 90694 VACC AIIV4 NO PRSRV 0.5ML IM: CPT | Performed by: INTERNAL MEDICINE

## 2020-11-19 NOTE — PROGRESS NOTES
2020     Liang Patel (:  1935) is a 80 y.o. female, here for an INR checkup. She says she feels fine otherwise. Chief Complaint   Patient presents with   C[facility]ares Office Visit for Anticoagulation Management         Review of Systems    Prior to Visit Medications    Medication Sig Taking? Authorizing Provider   metoprolol tartrate (LOPRESSOR) 50 MG tablet Take 1 tablet by mouth twice daily Yes Oma Reynoso DO   allopurinol (ZYLOPRIM) 100 MG tablet TAKE 1 TABLET BY MOUTH ONCE DAILY Yes Oma Reynoso DO   metFORMIN (GLUCOPHAGE) 500 MG tablet Take 1 tablet by mouth nightly Yes Oam Reynoso DO   DULoxetine (CYMBALTA) 60 MG extended release capsule Take 1 capsule by mouth once daily Yes Oma Reynoso DO   simvastatin (ZOCOR) 80 MG tablet Take 1 tablet by mouth nightly Yes Oma Reynoso DO   warfarin (COUMADIN) 4 MG tablet TAKE 1 TABLET BY MOUTH ONCE DAILY ON  TUESDAY,  THURSDAY,  AND  SATURDAY. TAKE  2  TABLETS  DAILY  OTHERWISE Yes Oma Reynoso DO   digoxin (LANOXIN) 125 MCG tablet Take 1 tablet by mouth daily Yes Oma Reynoso DO   solifenacin (VESICARE) 5 MG tablet Take 1 tablet by mouth daily Yes Oma Reynoso DO   cloNIDine (CATAPRES) 0.1 MG tablet TAKE 1 TABLET BY MOUTH TWICE DAILY Yes Historical Provider, MD   glimepiride (AMARYL) 1 MG tablet  Yes Historical Provider, MD   cimetidine (TAGAMET) 800 MG tablet Take 1 tablet by mouth 2 times daily  Oma Reynoso DO   blood glucose test strips (RELION GLUCOSE TEST STRIPS) strip 1 each by In Vitro route daily As needed.   Oma Reynoso DO   cloNIDine (CATAPRES) 0.1 MG tablet Take 1 tablet by mouth 2 times daily  Oma Reynoso DO   LUTEIN PO Take by mouth  Historical Provider, MD   niacin (SLO-NIACIN) 500 MG extended release tablet Take by mouth  Historical Provider, MD      Allergies   Allergen Reactions    Latex     Adhesive Tape Rash     Skin blister/\tearing Past Medical History:   Diagnosis Date    Anxiety     Arrhythmia     PSVT vs A fib    Atrial fibrillation ([address]     CAD (coronary artery disease)     Diverticulosis     Hyperlipidemia     Hypertension     Osteoarthritis     Post herpetic neuralgia 2004    Scoliosis     Lumbar     Past Surgical History:   Procedure Laterality Date    ANGIOPLASTY  2003    Stent placement     HEMORRHOID SURGERY  2002    HYSTERECTOMY, TOTAL ABDOMINAL      JOINT REPLACEMENT Left 2010    shoulder    LAMINECTOMY      L4-L5    TOTAL HIP ARTHROPLASTY Left       Social History     Tobacco Use    Smoking status: Former Smoker     Types: Cigarettes     Last attempt to quit: 1974     Years since quittin.9    Smokeless tobacco: Never Used   Substance Use Topics    Alcohol use: Never     Frequency: Never        Vitals:    20 1354   BP: (!) 150/80   Pulse: 57   Resp: 16   Temp: 98.2 °F (36.8 °C)   SpO2: 98%   Weight: 167 lb (75.8 kg)     Estimated body mass index is 28.67 kg/m² as calculated from the following:    Height as of 20: 5' 4\" (1.626 m). Weight as of this encounter: 167 lb (75.8 kg). Physical Exam  Heart is atrial fibrillation and flutter. Lungs are clear to auscultation without wheezes rales or rhonchi. There is no edema of the lower extremities. She ambulates without any difficulty. INR:  2.4 today    ASSESSMENT/PLAN:  Yessenia Chakraborty was seen today for office visit for anticoagulation management. Diagnoses and all orders for this visit:    Anticoagulant long-term use  -     POCT INR       Written instructions were given to the patient today. No changes in her current warfarin therapy. An electronic signature was used to authenticate this note.     --Delmi Pradhan DO on 2020 at 2:10 PM